# Patient Record
Sex: MALE | Race: WHITE | NOT HISPANIC OR LATINO
[De-identification: names, ages, dates, MRNs, and addresses within clinical notes are randomized per-mention and may not be internally consistent; named-entity substitution may affect disease eponyms.]

---

## 2020-04-01 ENCOUNTER — APPOINTMENT (OUTPATIENT)
Dept: GASTROENTEROLOGY | Facility: CLINIC | Age: 55
End: 2020-04-01

## 2023-05-23 ENCOUNTER — APPOINTMENT (OUTPATIENT)
Dept: OPHTHALMOLOGY | Facility: CLINIC | Age: 58
End: 2023-05-23
Payer: COMMERCIAL

## 2023-05-23 ENCOUNTER — NON-APPOINTMENT (OUTPATIENT)
Age: 58
End: 2023-05-23

## 2023-05-23 PROCEDURE — 92136 OPHTHALMIC BIOMETRY: CPT

## 2023-05-23 PROCEDURE — 92004 COMPRE OPH EXAM NEW PT 1/>: CPT

## 2023-09-11 ENCOUNTER — APPOINTMENT (OUTPATIENT)
Dept: SURGERY | Facility: CLINIC | Age: 58
End: 2023-09-11
Payer: COMMERCIAL

## 2023-09-11 VITALS
TEMPERATURE: 97.9 F | HEART RATE: 106 BPM | WEIGHT: 160 LBS | HEIGHT: 72 IN | BODY MASS INDEX: 21.67 KG/M2 | DIASTOLIC BLOOD PRESSURE: 90 MMHG | SYSTOLIC BLOOD PRESSURE: 158 MMHG

## 2023-09-11 DIAGNOSIS — Z98.890 OTHER SPECIFIED POSTPROCEDURAL STATES: ICD-10-CM

## 2023-09-11 DIAGNOSIS — Z83.3 FAMILY HISTORY OF DIABETES MELLITUS: ICD-10-CM

## 2023-09-11 DIAGNOSIS — K46.9 UNSPECIFIED ABDOMINAL HERNIA W/OUT OBSTRUCTION OR GANGRENE: ICD-10-CM

## 2023-09-11 DIAGNOSIS — Z87.19 OTHER SPECIFIED POSTPROCEDURAL STATES: ICD-10-CM

## 2023-09-11 DIAGNOSIS — E78.9 DISORDER OF LIPOPROTEIN METABOLISM, UNSPECIFIED: ICD-10-CM

## 2023-09-11 DIAGNOSIS — Z86.39 PERSONAL HISTORY OF OTHER ENDOCRINE, NUTRITIONAL AND METABOLIC DISEASE: ICD-10-CM

## 2023-09-11 DIAGNOSIS — Z78.9 OTHER SPECIFIED HEALTH STATUS: ICD-10-CM

## 2023-09-11 PROCEDURE — 99203 OFFICE O/P NEW LOW 30 MIN: CPT

## 2023-09-11 RX ORDER — DAPAGLIFLOZIN 10 MG/1
TABLET, FILM COATED ORAL
Refills: 0 | Status: ACTIVE | COMMUNITY

## 2023-09-11 RX ORDER — SITAGLIPTIN AND METFORMIN HYDROCHLORIDE 50; 1000 MG/1; MG/1
TABLET, FILM COATED ORAL
Refills: 0 | Status: ACTIVE | COMMUNITY

## 2023-10-19 ENCOUNTER — OUTPATIENT (OUTPATIENT)
Dept: OUTPATIENT SERVICES | Facility: HOSPITAL | Age: 58
LOS: 1 days | Discharge: HOME | End: 2023-10-19
Payer: COMMERCIAL

## 2023-10-19 VITALS
RESPIRATION RATE: 18 BRPM | HEIGHT: 71 IN | TEMPERATURE: 98 F | DIASTOLIC BLOOD PRESSURE: 86 MMHG | SYSTOLIC BLOOD PRESSURE: 161 MMHG | WEIGHT: 163.8 LBS | HEART RATE: 88 BPM | OXYGEN SATURATION: 100 %

## 2023-10-19 DIAGNOSIS — K40.91 UNILATERAL INGUINAL HERNIA, WITHOUT OBSTRUCTION OR GANGRENE, RECURRENT: ICD-10-CM

## 2023-10-19 DIAGNOSIS — Z01.818 ENCOUNTER FOR OTHER PREPROCEDURAL EXAMINATION: ICD-10-CM

## 2023-10-19 DIAGNOSIS — E11.9 TYPE 2 DIABETES MELLITUS WITHOUT COMPLICATIONS: ICD-10-CM

## 2023-10-19 DIAGNOSIS — Z01.812 ENCOUNTER FOR PREPROCEDURAL LABORATORY EXAMINATION: ICD-10-CM

## 2023-10-19 DIAGNOSIS — K40.90 UNILATERAL INGUINAL HERNIA, WITHOUT OBSTRUCTION OR GANGRENE, NOT SPECIFIED AS RECURRENT: ICD-10-CM

## 2023-10-19 LAB
A1C WITH ESTIMATED AVERAGE GLUCOSE RESULT: 7.8 % — HIGH (ref 4–5.6)
A1C WITH ESTIMATED AVERAGE GLUCOSE RESULT: 7.8 % — HIGH (ref 4–5.6)
ALBUMIN SERPL ELPH-MCNC: 4 G/DL — SIGNIFICANT CHANGE UP (ref 3.3–5)
ALBUMIN SERPL ELPH-MCNC: 4 G/DL — SIGNIFICANT CHANGE UP (ref 3.3–5)
ALP SERPL-CCNC: 55 U/L — SIGNIFICANT CHANGE UP (ref 40–120)
ALP SERPL-CCNC: 55 U/L — SIGNIFICANT CHANGE UP (ref 40–120)
ALT FLD-CCNC: 21 U/L — SIGNIFICANT CHANGE UP (ref 12–78)
ALT FLD-CCNC: 21 U/L — SIGNIFICANT CHANGE UP (ref 12–78)
ANION GAP SERPL CALC-SCNC: 4 MMOL/L — LOW (ref 5–17)
ANION GAP SERPL CALC-SCNC: 4 MMOL/L — LOW (ref 5–17)
AST SERPL-CCNC: 12 U/L — LOW (ref 15–37)
AST SERPL-CCNC: 12 U/L — LOW (ref 15–37)
BASOPHILS # BLD AUTO: 0.06 K/UL — SIGNIFICANT CHANGE UP (ref 0–0.2)
BASOPHILS # BLD AUTO: 0.06 K/UL — SIGNIFICANT CHANGE UP (ref 0–0.2)
BASOPHILS NFR BLD AUTO: 1 % — SIGNIFICANT CHANGE UP (ref 0–2)
BASOPHILS NFR BLD AUTO: 1 % — SIGNIFICANT CHANGE UP (ref 0–2)
BILIRUB SERPL-MCNC: 0.5 MG/DL — SIGNIFICANT CHANGE UP (ref 0.2–1.2)
BILIRUB SERPL-MCNC: 0.5 MG/DL — SIGNIFICANT CHANGE UP (ref 0.2–1.2)
BUN SERPL-MCNC: 16 MG/DL — SIGNIFICANT CHANGE UP (ref 7–23)
BUN SERPL-MCNC: 16 MG/DL — SIGNIFICANT CHANGE UP (ref 7–23)
CALCIUM SERPL-MCNC: 9.3 MG/DL — SIGNIFICANT CHANGE UP (ref 8.5–10.1)
CALCIUM SERPL-MCNC: 9.3 MG/DL — SIGNIFICANT CHANGE UP (ref 8.5–10.1)
CHLORIDE SERPL-SCNC: 104 MMOL/L — SIGNIFICANT CHANGE UP (ref 96–108)
CHLORIDE SERPL-SCNC: 104 MMOL/L — SIGNIFICANT CHANGE UP (ref 96–108)
CO2 SERPL-SCNC: 29 MMOL/L — SIGNIFICANT CHANGE UP (ref 22–31)
CO2 SERPL-SCNC: 29 MMOL/L — SIGNIFICANT CHANGE UP (ref 22–31)
CREAT SERPL-MCNC: 0.66 MG/DL — SIGNIFICANT CHANGE UP (ref 0.5–1.3)
CREAT SERPL-MCNC: 0.66 MG/DL — SIGNIFICANT CHANGE UP (ref 0.5–1.3)
EGFR: 111 ML/MIN/1.73M2 — SIGNIFICANT CHANGE UP
EGFR: 111 ML/MIN/1.73M2 — SIGNIFICANT CHANGE UP
EOSINOPHIL # BLD AUTO: 0.06 K/UL — SIGNIFICANT CHANGE UP (ref 0–0.5)
EOSINOPHIL # BLD AUTO: 0.06 K/UL — SIGNIFICANT CHANGE UP (ref 0–0.5)
EOSINOPHIL NFR BLD AUTO: 1 % — SIGNIFICANT CHANGE UP (ref 0–6)
EOSINOPHIL NFR BLD AUTO: 1 % — SIGNIFICANT CHANGE UP (ref 0–6)
ESTIMATED AVERAGE GLUCOSE: 177 MG/DL — HIGH (ref 68–114)
ESTIMATED AVERAGE GLUCOSE: 177 MG/DL — HIGH (ref 68–114)
GLUCOSE SERPL-MCNC: 206 MG/DL — HIGH (ref 70–99)
GLUCOSE SERPL-MCNC: 206 MG/DL — HIGH (ref 70–99)
HCT VFR BLD CALC: 45.2 % — SIGNIFICANT CHANGE UP (ref 39–50)
HCT VFR BLD CALC: 45.2 % — SIGNIFICANT CHANGE UP (ref 39–50)
HGB BLD-MCNC: 14.7 G/DL — SIGNIFICANT CHANGE UP (ref 13–17)
HGB BLD-MCNC: 14.7 G/DL — SIGNIFICANT CHANGE UP (ref 13–17)
IMM GRANULOCYTES NFR BLD AUTO: 0.3 % — SIGNIFICANT CHANGE UP (ref 0–0.9)
IMM GRANULOCYTES NFR BLD AUTO: 0.3 % — SIGNIFICANT CHANGE UP (ref 0–0.9)
LYMPHOCYTES # BLD AUTO: 1.55 K/UL — SIGNIFICANT CHANGE UP (ref 1–3.3)
LYMPHOCYTES # BLD AUTO: 1.55 K/UL — SIGNIFICANT CHANGE UP (ref 1–3.3)
LYMPHOCYTES # BLD AUTO: 26.1 % — SIGNIFICANT CHANGE UP (ref 13–44)
LYMPHOCYTES # BLD AUTO: 26.1 % — SIGNIFICANT CHANGE UP (ref 13–44)
MCHC RBC-ENTMCNC: 31.7 PG — SIGNIFICANT CHANGE UP (ref 27–34)
MCHC RBC-ENTMCNC: 31.7 PG — SIGNIFICANT CHANGE UP (ref 27–34)
MCHC RBC-ENTMCNC: 32.5 G/DL — SIGNIFICANT CHANGE UP (ref 32–36)
MCHC RBC-ENTMCNC: 32.5 G/DL — SIGNIFICANT CHANGE UP (ref 32–36)
MCV RBC AUTO: 97.4 FL — SIGNIFICANT CHANGE UP (ref 80–100)
MCV RBC AUTO: 97.4 FL — SIGNIFICANT CHANGE UP (ref 80–100)
MONOCYTES # BLD AUTO: 0.56 K/UL — SIGNIFICANT CHANGE UP (ref 0–0.9)
MONOCYTES # BLD AUTO: 0.56 K/UL — SIGNIFICANT CHANGE UP (ref 0–0.9)
MONOCYTES NFR BLD AUTO: 9.4 % — SIGNIFICANT CHANGE UP (ref 2–14)
MONOCYTES NFR BLD AUTO: 9.4 % — SIGNIFICANT CHANGE UP (ref 2–14)
NEUTROPHILS # BLD AUTO: 3.68 K/UL — SIGNIFICANT CHANGE UP (ref 1.8–7.4)
NEUTROPHILS # BLD AUTO: 3.68 K/UL — SIGNIFICANT CHANGE UP (ref 1.8–7.4)
NEUTROPHILS NFR BLD AUTO: 62.2 % — SIGNIFICANT CHANGE UP (ref 43–77)
NEUTROPHILS NFR BLD AUTO: 62.2 % — SIGNIFICANT CHANGE UP (ref 43–77)
NRBC # BLD: 0 /100 WBCS — SIGNIFICANT CHANGE UP (ref 0–0)
NRBC # BLD: 0 /100 WBCS — SIGNIFICANT CHANGE UP (ref 0–0)
PLATELET # BLD AUTO: 184 K/UL — SIGNIFICANT CHANGE UP (ref 150–400)
PLATELET # BLD AUTO: 184 K/UL — SIGNIFICANT CHANGE UP (ref 150–400)
POTASSIUM SERPL-MCNC: 3.7 MMOL/L — SIGNIFICANT CHANGE UP (ref 3.5–5.3)
POTASSIUM SERPL-MCNC: 3.7 MMOL/L — SIGNIFICANT CHANGE UP (ref 3.5–5.3)
POTASSIUM SERPL-SCNC: 3.7 MMOL/L — SIGNIFICANT CHANGE UP (ref 3.5–5.3)
POTASSIUM SERPL-SCNC: 3.7 MMOL/L — SIGNIFICANT CHANGE UP (ref 3.5–5.3)
PROT SERPL-MCNC: 7.4 GM/DL — SIGNIFICANT CHANGE UP (ref 6–8.3)
PROT SERPL-MCNC: 7.4 GM/DL — SIGNIFICANT CHANGE UP (ref 6–8.3)
RBC # BLD: 4.64 M/UL — SIGNIFICANT CHANGE UP (ref 4.2–5.8)
RBC # BLD: 4.64 M/UL — SIGNIFICANT CHANGE UP (ref 4.2–5.8)
RBC # FLD: 13 % — SIGNIFICANT CHANGE UP (ref 10.3–14.5)
RBC # FLD: 13 % — SIGNIFICANT CHANGE UP (ref 10.3–14.5)
SODIUM SERPL-SCNC: 137 MMOL/L — SIGNIFICANT CHANGE UP (ref 135–145)
SODIUM SERPL-SCNC: 137 MMOL/L — SIGNIFICANT CHANGE UP (ref 135–145)
WBC # BLD: 5.93 K/UL — SIGNIFICANT CHANGE UP (ref 3.8–10.5)
WBC # BLD: 5.93 K/UL — SIGNIFICANT CHANGE UP (ref 3.8–10.5)
WBC # FLD AUTO: 5.93 K/UL — SIGNIFICANT CHANGE UP (ref 3.8–10.5)
WBC # FLD AUTO: 5.93 K/UL — SIGNIFICANT CHANGE UP (ref 3.8–10.5)

## 2023-10-19 PROCEDURE — 93010 ELECTROCARDIOGRAM REPORT: CPT

## 2023-10-19 NOTE — H&P PST ADULT - PROBLEM SELECTOR PLAN 3
Preop instructions provided including NPO status. Hibiclens wash for infection control. Patient aware to stop NSAID, OTC herbals  for 7-10 days, needs to be accompanied  by adult upon discharge.  Patient verbalized understanding  Medical clearance required  anesthesiologist to review pst labs, ekg, medical clearances and optimization for surgery

## 2023-10-19 NOTE — H&P PST ADULT - ASSESSMENT
56 y/o male with hx of DM, with hx of left hernia surgery , h/o left elbow surgery (), h/o right knee surgery () presents with intermittent left groin discomfort and bulge here for presurgical examination for planned Open Left inguinal hernia repair with Dr. Joseph on 2023. Denies history of ischemic heart disease, CHF, CVA, TIA, or Kidney Disease. Denies resting or exertional chest pain, palpitations, headache, N/V, SOB, syncope or blurry vision. Denies personal or family hx of bleeding disorder or adverse reaction with anesthesia. Denies hx of DVT or PE. Denies recent travels in the past 30 days. No fever, SOB, cough, flu-like symptoms or body rash covid screen.     CAPRINI SCORE [CLOT]    AGE RELATED RISK FACTORS                                                       MOBILITY RELATED FACTORS  [x Age 41-60 years                                            (1 Point)                  [ ] Bed rest                                                        (1 Point)  [ ] Age: 61-74 years                                           (2 Points)                 [ ] Plaster cast                                                   (2 Points)  [ ] Age= 75 years                                              (3 Points)                 [ ] Bed bound for more than 72 hours                 (2 Points)    DISEASE RELATED RISK FACTORS                                               GENDER SPECIFIC FACTORS  [ ] Edema in the lower extremities                       (1 Point)                  [ ] Pregnancy                                                     (1 Point)  [ ] Varicose veins                                               (1 Point)                  [ ] Post-partum < 6 weeks                                   (1 Point)             [ ] BMI > 25 Kg/m2                                            (1 Point)                  [ ] Hormonal therapy  or oral contraception          (1 Point)                 [ ] Sepsis (in the previous month)                        (1 Point)                  [ ] History of pregnancy complications                 (1 point)  [ ] Pneumonia or serious lung disease                                               [ ] Unexplained or recurrent                     (1 Point)           (in the previous month)                               (1 Point)  [ ] Abnormal pulmonary function test                     (1 Point)                 SURGERY RELATED RISK FACTORS  [ ] Acute myocardial infarction                              (1 Point)                 [ ]  Section                                             (1 Point)  [ ] Congestive heart failure (in the previous month)  (1 Point)               [ ] Minor surgery                                                  (1 Point)   [ ] Inflammatory bowel disease                             (1 Point)                 [ ] Arthroscopic surgery                                        (2 Points)  [ ] Central venous access                                      (2 Points)                [x ] General surgery lasting more than 45 minutes   (2 Points)       [ ] Stroke (in the previous month)                          (5 Points)               [ ] Elective arthroplasty                                         (5 Points)                                                                                                                                               HEMATOLOGY RELATED FACTORS                                                 TRAUMA RELATED RISK FACTORS  [ ] Prior episodes of VTE                                     (3 Points)                [ ] Fracture of the hip, pelvis, or leg                       (5 Points)  [ ] Positive family history for VTE                         (3 Points)                 [ ] Acute spinal cord injury (in the previous month)  (5 Points)  [ ] Prothrombin 83995 A                                     (3 Points)                 [ ] Paralysis  (less than 1 month)                             (5 Points)  [ ] Factor V Leiden                                             (3 Points)                  [ ] Multiple Trauma within 1 month                        (5 Points)  [ ] Lupus anticoagulants                                     (3 Points)                                                           [ ] Anticardiolipin antibodies                               (3 Points)                                                       [ ] High homocysteine in the blood                      (3 Points)                                             [ ] Other congenital or acquired thrombophilia      (3 Points)                                                [ ] Heparin induced thrombocytopenia                  (3 Points)                                          Total Score [    3   ]    Caprini Score 0 - 2:  Low Risk, No VTE Prophylaxis required for most patients, encourage ambulation  Caprini Score 3 - 6:  At Risk, pharmacologic VTE prophylaxis is indicated for most patients (in the absence of a contraindication)  Caprini Score Greater than or = 7:  High Risk, pharmacologic VTE prophylaxis is indicated for most patients (in the absence of a contraindication)  Caprini Score 0-2: Low risk, No VTE Prophylaxis required for most patient's, encourage ambulation  Caprini Score 3-6: At Risk, Pharmacologic VTE prophylaxis is indicated for most patients ( in the absence of a contraindication)  Caprini Score Greater than or = 7: High Risk , pharmacologic VTE is indicated for most patients ( in the absence of a contraindication)    Caprini score indicates that the patient is high risk for VTE event ( score 6 or greater). Surgical patient's in this group will benefit from both pharmacologic prophylaxis and intermittent compression devices . Surgical team will determine the balance between VTE  risk and bleeding risk and other clinical considerations

## 2023-10-19 NOTE — H&P PST ADULT - NSICDXFAMILYHX_GEN_ALL_CORE_FT
FAMILY HISTORY:  Father  Still living? Unknown  FH: atrial fibrillation, Age at diagnosis: Age Unknown    Mother  Still living? Unknown  Family history of diabetes mellitus (DM), Age at diagnosis: Age Unknown

## 2023-10-19 NOTE — H&P PST ADULT - NSANTHOSAYNRD_GEN_A_CORE
No. CHAZ screening performed.  STOP BANG Legend: 0-2 = LOW Risk; 3-4 = INTERMEDIATE Risk; 5-8 = HIGH Risk

## 2023-10-22 RX ORDER — SODIUM CHLORIDE 9 MG/ML
3 INJECTION INTRAMUSCULAR; INTRAVENOUS; SUBCUTANEOUS EVERY 8 HOURS
Refills: 0 | Status: DISCONTINUED | OUTPATIENT
Start: 2023-11-02 | End: 2023-11-16

## 2023-11-01 ENCOUNTER — TRANSCRIPTION ENCOUNTER (OUTPATIENT)
Age: 58
End: 2023-11-01

## 2023-11-02 ENCOUNTER — TRANSCRIPTION ENCOUNTER (OUTPATIENT)
Age: 58
End: 2023-11-02

## 2023-11-02 ENCOUNTER — OUTPATIENT (OUTPATIENT)
Dept: OUTPATIENT SERVICES | Facility: HOSPITAL | Age: 58
LOS: 1 days | Discharge: HOME | End: 2023-11-02
Payer: COMMERCIAL

## 2023-11-02 ENCOUNTER — APPOINTMENT (OUTPATIENT)
Dept: SURGERY | Facility: HOSPITAL | Age: 58
End: 2023-11-02

## 2023-11-02 VITALS
WEIGHT: 163.8 LBS | OXYGEN SATURATION: 100 % | DIASTOLIC BLOOD PRESSURE: 89 MMHG | HEIGHT: 71 IN | HEART RATE: 84 BPM | RESPIRATION RATE: 16 BRPM | SYSTOLIC BLOOD PRESSURE: 158 MMHG | TEMPERATURE: 98 F

## 2023-11-02 VITALS
OXYGEN SATURATION: 100 % | DIASTOLIC BLOOD PRESSURE: 82 MMHG | SYSTOLIC BLOOD PRESSURE: 143 MMHG | HEART RATE: 76 BPM | RESPIRATION RATE: 16 BRPM

## 2023-11-02 LAB
GLUCOSE BLDC GLUCOMTR-MCNC: 171 MG/DL — HIGH (ref 70–99)
GLUCOSE BLDC GLUCOMTR-MCNC: 171 MG/DL — HIGH (ref 70–99)

## 2023-11-02 PROCEDURE — 49520 REREPAIR ING HERNIA REDUCE: CPT | Mod: GC

## 2023-11-02 DEVICE — MESH HERNIA PARIETEX PROGRIP 12 X 8CM LEFT: Type: IMPLANTABLE DEVICE | Site: LEFT | Status: FUNCTIONAL

## 2023-11-02 RX ORDER — HYDROMORPHONE HYDROCHLORIDE 2 MG/ML
0.5 INJECTION INTRAMUSCULAR; INTRAVENOUS; SUBCUTANEOUS
Refills: 0 | Status: DISCONTINUED | OUTPATIENT
Start: 2023-11-02 | End: 2023-11-04

## 2023-11-02 RX ORDER — SODIUM CHLORIDE 9 MG/ML
1000 INJECTION, SOLUTION INTRAVENOUS
Refills: 0 | Status: DISCONTINUED | OUTPATIENT
Start: 2023-11-02 | End: 2023-11-04

## 2023-11-02 RX ORDER — SITAGLIPTIN AND METFORMIN HYDROCHLORIDE 500; 50 MG/1; MG/1
1 TABLET, FILM COATED ORAL
Refills: 0 | DISCHARGE

## 2023-11-02 RX ORDER — OXYCODONE HYDROCHLORIDE 5 MG/1
1 TABLET ORAL
Qty: 12 | Refills: 0
Start: 2023-11-02 | End: 2023-11-04

## 2023-11-02 RX ADMIN — SODIUM CHLORIDE 75 MILLILITER(S): 9 INJECTION, SOLUTION INTRAVENOUS at 15:15

## 2023-11-02 NOTE — BRIEF OPERATIVE NOTE - NSICDXBRIEFPROCEDURE_GEN_ALL_CORE_FT
PROCEDURES:  Repair, hernia, inguinal, reducible, age 5 years or older 02-Nov-2023 14:20:49  Nicole Johnson

## 2023-11-02 NOTE — ASU PATIENT PROFILE, ADULT - FALL HARM RISK - HARM RISK INTERVENTIONS

## 2023-11-02 NOTE — ASU DISCHARGE PLAN (ADULT/PEDIATRIC) - CARE PROVIDER_API CALL
Huan Joseph  Surgery  [address] [place] [address]  Phone: [phone]  Fax: [phone]  Follow Up Time:

## 2023-11-02 NOTE — ASU DISCHARGE PLAN (ADULT/PEDIATRIC) - ASU DC SPECIAL INSTRUCTIONSFT
Pain management and post-op instructions about your surgery:    Extra strength Tylenol 500mg 2 tabs and Ibuprofen 600mg every 6 hours together  Oxycodone 5mg every 6 hours as needed for breakthrough pain  Ice pack  Walk as much as tolerated  You may shower. Do not scrub incision. Pat Dry abdomen. Leave the white steri strips in place, they will fall off on their own in approximately 5-7 days.     Follow up with Dr. Joseph in 2 weeks.  You may call the office to schedule an appointment.

## 2023-11-02 NOTE — ASU DISCHARGE PLAN (ADULT/PEDIATRIC) - NS MD DC FALL RISK RISK
For information on Fall & Injury Prevention, visit: [email or web address] OR  [email or web address] OR  https://www.cdc.gov/steadi/patient.html

## 2023-11-09 DIAGNOSIS — E11.9 TYPE 2 DIABETES MELLITUS WITHOUT COMPLICATIONS: ICD-10-CM

## 2023-11-09 DIAGNOSIS — K40.31 UNILATERAL INGUINAL HERNIA, WITH OBSTRUCTION, WITHOUT GANGRENE, RECURRENT: ICD-10-CM

## 2023-11-09 DIAGNOSIS — Z79.84 LONG TERM (CURRENT) USE OF ORAL HYPOGLYCEMIC DRUGS: ICD-10-CM

## 2023-11-09 DIAGNOSIS — K40.91 UNILATERAL INGUINAL HERNIA, WITHOUT OBSTRUCTION OR GANGRENE, RECURRENT: ICD-10-CM

## 2023-11-13 ENCOUNTER — APPOINTMENT (OUTPATIENT)
Dept: SURGERY | Facility: CLINIC | Age: 58
End: 2023-11-13
Payer: COMMERCIAL

## 2023-11-13 VITALS
DIASTOLIC BLOOD PRESSURE: 82 MMHG | HEIGHT: 72 IN | HEART RATE: 80 BPM | WEIGHT: 160 LBS | BODY MASS INDEX: 21.67 KG/M2 | SYSTOLIC BLOOD PRESSURE: 134 MMHG | TEMPERATURE: 97.9 F

## 2023-11-13 DIAGNOSIS — Z83.3 FAMILY HISTORY OF DIABETES MELLITUS: ICD-10-CM

## 2023-11-13 DIAGNOSIS — Z78.9 OTHER SPECIFIED HEALTH STATUS: ICD-10-CM

## 2023-11-13 PROCEDURE — 99024 POSTOP FOLLOW-UP VISIT: CPT

## 2023-12-09 ENCOUNTER — NON-APPOINTMENT (OUTPATIENT)
Age: 58
End: 2023-12-09

## 2023-12-12 ENCOUNTER — INPATIENT (INPATIENT)
Facility: HOSPITAL | Age: 58
LOS: 1 days | Discharge: HOME | DRG: 639 | End: 2023-12-14
Attending: INTERNAL MEDICINE | Admitting: INTERNAL MEDICINE
Payer: COMMERCIAL

## 2023-12-12 VITALS
TEMPERATURE: 97 F | WEIGHT: 160.06 LBS | RESPIRATION RATE: 16 BRPM | SYSTOLIC BLOOD PRESSURE: 139 MMHG | HEART RATE: 112 BPM | OXYGEN SATURATION: 100 % | HEIGHT: 71 IN | DIASTOLIC BLOOD PRESSURE: 89 MMHG

## 2023-12-12 DIAGNOSIS — E11.10 TYPE 2 DIABETES MELLITUS WITH KETOACIDOSIS WITHOUT COMA: ICD-10-CM

## 2023-12-12 LAB
ACETONE SERPL-MCNC: ABNORMAL
ACETONE SERPL-MCNC: ABNORMAL
ALBUMIN SERPL ELPH-MCNC: 3.3 G/DL — SIGNIFICANT CHANGE UP (ref 3.3–5)
ALBUMIN SERPL ELPH-MCNC: 3.3 G/DL — SIGNIFICANT CHANGE UP (ref 3.3–5)
ALBUMIN SERPL ELPH-MCNC: 4.1 G/DL — SIGNIFICANT CHANGE UP (ref 3.3–5)
ALBUMIN SERPL ELPH-MCNC: 4.1 G/DL — SIGNIFICANT CHANGE UP (ref 3.3–5)
ALP SERPL-CCNC: 44 U/L — SIGNIFICANT CHANGE UP (ref 40–120)
ALP SERPL-CCNC: 44 U/L — SIGNIFICANT CHANGE UP (ref 40–120)
ALP SERPL-CCNC: 57 U/L — SIGNIFICANT CHANGE UP (ref 40–120)
ALP SERPL-CCNC: 57 U/L — SIGNIFICANT CHANGE UP (ref 40–120)
ALT FLD-CCNC: 18 U/L — SIGNIFICANT CHANGE UP (ref 10–45)
ALT FLD-CCNC: 18 U/L — SIGNIFICANT CHANGE UP (ref 10–45)
ALT FLD-CCNC: 19 U/L — SIGNIFICANT CHANGE UP (ref 10–45)
ALT FLD-CCNC: 19 U/L — SIGNIFICANT CHANGE UP (ref 10–45)
AMPHET UR-MCNC: NEGATIVE — SIGNIFICANT CHANGE UP
AMPHET UR-MCNC: NEGATIVE — SIGNIFICANT CHANGE UP
ANION GAP SERPL CALC-SCNC: 18 MMOL/L — HIGH (ref 5–17)
ANION GAP SERPL CALC-SCNC: 18 MMOL/L — HIGH (ref 5–17)
ANION GAP SERPL CALC-SCNC: 20 MMOL/L — HIGH (ref 5–17)
ANION GAP SERPL CALC-SCNC: 20 MMOL/L — HIGH (ref 5–17)
ANION GAP SERPL CALC-SCNC: 21 MMOL/L — HIGH (ref 5–17)
ANION GAP SERPL CALC-SCNC: 21 MMOL/L — HIGH (ref 5–17)
ANION GAP SERPL CALC-SCNC: 28 MMOL/L — HIGH (ref 5–17)
ANION GAP SERPL CALC-SCNC: 28 MMOL/L — HIGH (ref 5–17)
AST SERPL-CCNC: 12 U/L — SIGNIFICANT CHANGE UP (ref 10–40)
AST SERPL-CCNC: 12 U/L — SIGNIFICANT CHANGE UP (ref 10–40)
AST SERPL-CCNC: 17 U/L — SIGNIFICANT CHANGE UP (ref 10–40)
AST SERPL-CCNC: 17 U/L — SIGNIFICANT CHANGE UP (ref 10–40)
BARBITURATES UR SCN-MCNC: NEGATIVE — SIGNIFICANT CHANGE UP
BARBITURATES UR SCN-MCNC: NEGATIVE — SIGNIFICANT CHANGE UP
BASE EXCESS BLDA CALC-SCNC: -17.9 MMOL/L — LOW (ref -2–3)
BASE EXCESS BLDA CALC-SCNC: -17.9 MMOL/L — LOW (ref -2–3)
BASE EXCESS BLDV CALC-SCNC: -18.1 MMOL/L — LOW (ref -2–3)
BASE EXCESS BLDV CALC-SCNC: -18.1 MMOL/L — LOW (ref -2–3)
BASOPHILS # BLD AUTO: 0.01 K/UL — SIGNIFICANT CHANGE UP (ref 0–0.2)
BASOPHILS # BLD AUTO: 0.01 K/UL — SIGNIFICANT CHANGE UP (ref 0–0.2)
BASOPHILS NFR BLD AUTO: 0.1 % — SIGNIFICANT CHANGE UP (ref 0–2)
BASOPHILS NFR BLD AUTO: 0.1 % — SIGNIFICANT CHANGE UP (ref 0–2)
BENZODIAZ UR-MCNC: NEGATIVE — SIGNIFICANT CHANGE UP
BENZODIAZ UR-MCNC: NEGATIVE — SIGNIFICANT CHANGE UP
BILIRUB SERPL-MCNC: 0.3 MG/DL — SIGNIFICANT CHANGE UP (ref 0.2–1.2)
BILIRUB SERPL-MCNC: 0.3 MG/DL — SIGNIFICANT CHANGE UP (ref 0.2–1.2)
BILIRUB SERPL-MCNC: 0.4 MG/DL — SIGNIFICANT CHANGE UP (ref 0.2–1.2)
BILIRUB SERPL-MCNC: 0.4 MG/DL — SIGNIFICANT CHANGE UP (ref 0.2–1.2)
BLOOD GAS COMMENTS ARTERIAL: SIGNIFICANT CHANGE UP
BLOOD GAS COMMENTS ARTERIAL: SIGNIFICANT CHANGE UP
BUN SERPL-MCNC: 19 MG/DL — SIGNIFICANT CHANGE UP (ref 7–23)
BUN SERPL-MCNC: 19 MG/DL — SIGNIFICANT CHANGE UP (ref 7–23)
BUN SERPL-MCNC: 20 MG/DL — SIGNIFICANT CHANGE UP (ref 7–23)
BUN SERPL-MCNC: 20 MG/DL — SIGNIFICANT CHANGE UP (ref 7–23)
BUN SERPL-MCNC: 21 MG/DL — SIGNIFICANT CHANGE UP (ref 7–23)
BUN SERPL-MCNC: 21 MG/DL — SIGNIFICANT CHANGE UP (ref 7–23)
BUN SERPL-MCNC: 26 MG/DL — HIGH (ref 7–23)
BUN SERPL-MCNC: 26 MG/DL — HIGH (ref 7–23)
CALCIUM SERPL-MCNC: 8.4 MG/DL — SIGNIFICANT CHANGE UP (ref 8.4–10.5)
CALCIUM SERPL-MCNC: 8.4 MG/DL — SIGNIFICANT CHANGE UP (ref 8.4–10.5)
CALCIUM SERPL-MCNC: 8.6 MG/DL — SIGNIFICANT CHANGE UP (ref 8.4–10.5)
CALCIUM SERPL-MCNC: 8.6 MG/DL — SIGNIFICANT CHANGE UP (ref 8.4–10.5)
CALCIUM SERPL-MCNC: 8.7 MG/DL — SIGNIFICANT CHANGE UP (ref 8.4–10.5)
CALCIUM SERPL-MCNC: 8.7 MG/DL — SIGNIFICANT CHANGE UP (ref 8.4–10.5)
CALCIUM SERPL-MCNC: 9.8 MG/DL — SIGNIFICANT CHANGE UP (ref 8.4–10.5)
CALCIUM SERPL-MCNC: 9.8 MG/DL — SIGNIFICANT CHANGE UP (ref 8.4–10.5)
CHLORIDE SERPL-SCNC: 104 MMOL/L — SIGNIFICANT CHANGE UP (ref 96–108)
CHLORIDE SERPL-SCNC: 104 MMOL/L — SIGNIFICANT CHANGE UP (ref 96–108)
CHLORIDE SERPL-SCNC: 105 MMOL/L — SIGNIFICANT CHANGE UP (ref 96–108)
CHLORIDE SERPL-SCNC: 105 MMOL/L — SIGNIFICANT CHANGE UP (ref 96–108)
CHLORIDE SERPL-SCNC: 106 MMOL/L — SIGNIFICANT CHANGE UP (ref 96–108)
CHLORIDE SERPL-SCNC: 106 MMOL/L — SIGNIFICANT CHANGE UP (ref 96–108)
CHLORIDE SERPL-SCNC: 98 MMOL/L — SIGNIFICANT CHANGE UP (ref 96–108)
CHLORIDE SERPL-SCNC: 98 MMOL/L — SIGNIFICANT CHANGE UP (ref 96–108)
CO2 BLDA-SCNC: 11 MMOL/L — LOW (ref 19–24)
CO2 BLDA-SCNC: 11 MMOL/L — LOW (ref 19–24)
CO2 BLDV-SCNC: 12 MMOL/L — LOW (ref 22–26)
CO2 BLDV-SCNC: 12 MMOL/L — LOW (ref 22–26)
CO2 SERPL-SCNC: 11 MMOL/L — LOW (ref 22–31)
CO2 SERPL-SCNC: 11 MMOL/L — LOW (ref 22–31)
CO2 SERPL-SCNC: 14 MMOL/L — LOW (ref 22–31)
CO2 SERPL-SCNC: 17 MMOL/L — LOW (ref 22–31)
CO2 SERPL-SCNC: 17 MMOL/L — LOW (ref 22–31)
COCAINE METAB.OTHER UR-MCNC: NEGATIVE — SIGNIFICANT CHANGE UP
COCAINE METAB.OTHER UR-MCNC: NEGATIVE — SIGNIFICANT CHANGE UP
CREAT SERPL-MCNC: 0.94 MG/DL — SIGNIFICANT CHANGE UP (ref 0.5–1.3)
CREAT SERPL-MCNC: 0.94 MG/DL — SIGNIFICANT CHANGE UP (ref 0.5–1.3)
CREAT SERPL-MCNC: 1.02 MG/DL — SIGNIFICANT CHANGE UP (ref 0.5–1.3)
CREAT SERPL-MCNC: 1.02 MG/DL — SIGNIFICANT CHANGE UP (ref 0.5–1.3)
CREAT SERPL-MCNC: 1.06 MG/DL — SIGNIFICANT CHANGE UP (ref 0.5–1.3)
CREAT SERPL-MCNC: 1.06 MG/DL — SIGNIFICANT CHANGE UP (ref 0.5–1.3)
CREAT SERPL-MCNC: 1.18 MG/DL — SIGNIFICANT CHANGE UP (ref 0.5–1.3)
CREAT SERPL-MCNC: 1.18 MG/DL — SIGNIFICANT CHANGE UP (ref 0.5–1.3)
EGFR: 73 ML/MIN/1.73M2 — SIGNIFICANT CHANGE UP
EGFR: 73 ML/MIN/1.73M2 — SIGNIFICANT CHANGE UP
EGFR: 83 ML/MIN/1.73M2 — SIGNIFICANT CHANGE UP
EGFR: 83 ML/MIN/1.73M2 — SIGNIFICANT CHANGE UP
EGFR: 87 ML/MIN/1.73M2 — SIGNIFICANT CHANGE UP
EGFR: 87 ML/MIN/1.73M2 — SIGNIFICANT CHANGE UP
EGFR: 96 ML/MIN/1.73M2 — SIGNIFICANT CHANGE UP
EGFR: 96 ML/MIN/1.73M2 — SIGNIFICANT CHANGE UP
EOSINOPHIL # BLD AUTO: 0.01 K/UL — SIGNIFICANT CHANGE UP (ref 0–0.5)
EOSINOPHIL # BLD AUTO: 0.01 K/UL — SIGNIFICANT CHANGE UP (ref 0–0.5)
EOSINOPHIL NFR BLD AUTO: 0.1 % — SIGNIFICANT CHANGE UP (ref 0–6)
EOSINOPHIL NFR BLD AUTO: 0.1 % — SIGNIFICANT CHANGE UP (ref 0–6)
FLUAV H3 RNA SPEC QL NAA+PROBE: DETECTED
FLUAV H3 RNA SPEC QL NAA+PROBE: DETECTED
GAS PNL BLDA: SIGNIFICANT CHANGE UP
GAS PNL BLDA: SIGNIFICANT CHANGE UP
GAS PNL BLDV: SIGNIFICANT CHANGE UP
GAS PNL BLDV: SIGNIFICANT CHANGE UP
GLUCOSE BLDC GLUCOMTR-MCNC: 113 MG/DL — HIGH (ref 70–99)
GLUCOSE BLDC GLUCOMTR-MCNC: 113 MG/DL — HIGH (ref 70–99)
GLUCOSE BLDC GLUCOMTR-MCNC: 120 MG/DL — HIGH (ref 70–99)
GLUCOSE BLDC GLUCOMTR-MCNC: 120 MG/DL — HIGH (ref 70–99)
GLUCOSE BLDC GLUCOMTR-MCNC: 125 MG/DL — HIGH (ref 70–99)
GLUCOSE BLDC GLUCOMTR-MCNC: 125 MG/DL — HIGH (ref 70–99)
GLUCOSE BLDC GLUCOMTR-MCNC: 128 MG/DL — HIGH (ref 70–99)
GLUCOSE BLDC GLUCOMTR-MCNC: 128 MG/DL — HIGH (ref 70–99)
GLUCOSE BLDC GLUCOMTR-MCNC: 137 MG/DL — HIGH (ref 70–99)
GLUCOSE BLDC GLUCOMTR-MCNC: 137 MG/DL — HIGH (ref 70–99)
GLUCOSE BLDC GLUCOMTR-MCNC: 154 MG/DL — HIGH (ref 70–99)
GLUCOSE BLDC GLUCOMTR-MCNC: 154 MG/DL — HIGH (ref 70–99)
GLUCOSE BLDC GLUCOMTR-MCNC: 164 MG/DL — HIGH (ref 70–99)
GLUCOSE BLDC GLUCOMTR-MCNC: 164 MG/DL — HIGH (ref 70–99)
GLUCOSE BLDC GLUCOMTR-MCNC: 202 MG/DL — HIGH (ref 70–99)
GLUCOSE BLDC GLUCOMTR-MCNC: 202 MG/DL — HIGH (ref 70–99)
GLUCOSE BLDC GLUCOMTR-MCNC: 242 MG/DL — HIGH (ref 70–99)
GLUCOSE BLDC GLUCOMTR-MCNC: 242 MG/DL — HIGH (ref 70–99)
GLUCOSE SERPL-MCNC: 143 MG/DL — HIGH (ref 70–99)
GLUCOSE SERPL-MCNC: 143 MG/DL — HIGH (ref 70–99)
GLUCOSE SERPL-MCNC: 157 MG/DL — HIGH (ref 70–99)
GLUCOSE SERPL-MCNC: 157 MG/DL — HIGH (ref 70–99)
GLUCOSE SERPL-MCNC: 231 MG/DL — HIGH (ref 70–99)
GLUCOSE SERPL-MCNC: 231 MG/DL — HIGH (ref 70–99)
GLUCOSE SERPL-MCNC: 294 MG/DL — HIGH (ref 70–99)
GLUCOSE SERPL-MCNC: 294 MG/DL — HIGH (ref 70–99)
HCO3 BLDA-SCNC: 10 MMOL/L — CRITICAL LOW (ref 21–28)
HCO3 BLDA-SCNC: 10 MMOL/L — CRITICAL LOW (ref 21–28)
HCO3 BLDV-SCNC: 11 MMOL/L — LOW (ref 22–29)
HCO3 BLDV-SCNC: 11 MMOL/L — LOW (ref 22–29)
HCT VFR BLD CALC: 50.1 % — HIGH (ref 39–50)
HCT VFR BLD CALC: 50.1 % — HIGH (ref 39–50)
HGB BLD-MCNC: 16.1 G/DL — SIGNIFICANT CHANGE UP (ref 13–17)
HGB BLD-MCNC: 16.1 G/DL — SIGNIFICANT CHANGE UP (ref 13–17)
HOROWITZ INDEX BLDA+IHG-RTO: 21 — SIGNIFICANT CHANGE UP
HOROWITZ INDEX BLDA+IHG-RTO: 21 — SIGNIFICANT CHANGE UP
IMM GRANULOCYTES NFR BLD AUTO: 0.5 % — SIGNIFICANT CHANGE UP (ref 0–0.9)
IMM GRANULOCYTES NFR BLD AUTO: 0.5 % — SIGNIFICANT CHANGE UP (ref 0–0.9)
LIDOCAIN IGE QN: 17 U/L — SIGNIFICANT CHANGE UP (ref 16–77)
LIDOCAIN IGE QN: 17 U/L — SIGNIFICANT CHANGE UP (ref 16–77)
LYMPHOCYTES # BLD AUTO: 0.48 K/UL — LOW (ref 1–3.3)
LYMPHOCYTES # BLD AUTO: 0.48 K/UL — LOW (ref 1–3.3)
LYMPHOCYTES # BLD AUTO: 6.1 % — LOW (ref 13–44)
LYMPHOCYTES # BLD AUTO: 6.1 % — LOW (ref 13–44)
MAGNESIUM SERPL-MCNC: 2 MG/DL — SIGNIFICANT CHANGE UP (ref 1.6–2.6)
MAGNESIUM SERPL-MCNC: 2 MG/DL — SIGNIFICANT CHANGE UP (ref 1.6–2.6)
MAGNESIUM SERPL-MCNC: 2.1 MG/DL — SIGNIFICANT CHANGE UP (ref 1.6–2.6)
MAGNESIUM SERPL-MCNC: 2.1 MG/DL — SIGNIFICANT CHANGE UP (ref 1.6–2.6)
MCHC RBC-ENTMCNC: 31.9 PG — SIGNIFICANT CHANGE UP (ref 27–34)
MCHC RBC-ENTMCNC: 31.9 PG — SIGNIFICANT CHANGE UP (ref 27–34)
MCHC RBC-ENTMCNC: 32.1 GM/DL — SIGNIFICANT CHANGE UP (ref 32–36)
MCHC RBC-ENTMCNC: 32.1 GM/DL — SIGNIFICANT CHANGE UP (ref 32–36)
MCV RBC AUTO: 99.4 FL — SIGNIFICANT CHANGE UP (ref 80–100)
MCV RBC AUTO: 99.4 FL — SIGNIFICANT CHANGE UP (ref 80–100)
METHADONE UR-MCNC: NEGATIVE — SIGNIFICANT CHANGE UP
METHADONE UR-MCNC: NEGATIVE — SIGNIFICANT CHANGE UP
MONOCYTES # BLD AUTO: 0.59 K/UL — SIGNIFICANT CHANGE UP (ref 0–0.9)
MONOCYTES # BLD AUTO: 0.59 K/UL — SIGNIFICANT CHANGE UP (ref 0–0.9)
MONOCYTES NFR BLD AUTO: 7.5 % — SIGNIFICANT CHANGE UP (ref 2–14)
MONOCYTES NFR BLD AUTO: 7.5 % — SIGNIFICANT CHANGE UP (ref 2–14)
NEUTROPHILS # BLD AUTO: 6.7 K/UL — SIGNIFICANT CHANGE UP (ref 1.8–7.4)
NEUTROPHILS # BLD AUTO: 6.7 K/UL — SIGNIFICANT CHANGE UP (ref 1.8–7.4)
NEUTROPHILS NFR BLD AUTO: 85.7 % — HIGH (ref 43–77)
NEUTROPHILS NFR BLD AUTO: 85.7 % — HIGH (ref 43–77)
NRBC # BLD: 0 /100 WBCS — SIGNIFICANT CHANGE UP (ref 0–0)
NRBC # BLD: 0 /100 WBCS — SIGNIFICANT CHANGE UP (ref 0–0)
OPIATES UR-MCNC: NEGATIVE — SIGNIFICANT CHANGE UP
OPIATES UR-MCNC: NEGATIVE — SIGNIFICANT CHANGE UP
PCO2 BLDA: 25 MMHG — LOW (ref 35–48)
PCO2 BLDA: 25 MMHG — LOW (ref 35–48)
PCO2 BLDV: 36 MMHG — LOW (ref 42–55)
PCO2 BLDV: 36 MMHG — LOW (ref 42–55)
PCP SPEC-MCNC: SIGNIFICANT CHANGE UP
PCP SPEC-MCNC: SIGNIFICANT CHANGE UP
PCP UR-MCNC: NEGATIVE — SIGNIFICANT CHANGE UP
PCP UR-MCNC: NEGATIVE — SIGNIFICANT CHANGE UP
PH BLDA: 7.21 — LOW (ref 7.35–7.45)
PH BLDA: 7.21 — LOW (ref 7.35–7.45)
PH BLDV: 7.11 — LOW (ref 7.32–7.43)
PH BLDV: 7.11 — LOW (ref 7.32–7.43)
PHOSPHATE SERPL-MCNC: 2 MG/DL — LOW (ref 2.5–4.5)
PHOSPHATE SERPL-MCNC: 2 MG/DL — LOW (ref 2.5–4.5)
PLATELET # BLD AUTO: 176 K/UL — SIGNIFICANT CHANGE UP (ref 150–400)
PLATELET # BLD AUTO: 176 K/UL — SIGNIFICANT CHANGE UP (ref 150–400)
PO2 BLDA: 113 MMHG — HIGH (ref 83–108)
PO2 BLDA: 113 MMHG — HIGH (ref 83–108)
PO2 BLDV: 40 MMHG — SIGNIFICANT CHANGE UP (ref 25–45)
PO2 BLDV: 40 MMHG — SIGNIFICANT CHANGE UP (ref 25–45)
POTASSIUM SERPL-MCNC: 3.9 MMOL/L — SIGNIFICANT CHANGE UP (ref 3.5–5.3)
POTASSIUM SERPL-MCNC: 3.9 MMOL/L — SIGNIFICANT CHANGE UP (ref 3.5–5.3)
POTASSIUM SERPL-MCNC: 4.1 MMOL/L — SIGNIFICANT CHANGE UP (ref 3.5–5.3)
POTASSIUM SERPL-MCNC: 4.1 MMOL/L — SIGNIFICANT CHANGE UP (ref 3.5–5.3)
POTASSIUM SERPL-MCNC: 4.5 MMOL/L — SIGNIFICANT CHANGE UP (ref 3.5–5.3)
POTASSIUM SERPL-MCNC: 4.5 MMOL/L — SIGNIFICANT CHANGE UP (ref 3.5–5.3)
POTASSIUM SERPL-MCNC: 4.6 MMOL/L — SIGNIFICANT CHANGE UP (ref 3.5–5.3)
POTASSIUM SERPL-MCNC: 4.6 MMOL/L — SIGNIFICANT CHANGE UP (ref 3.5–5.3)
POTASSIUM SERPL-SCNC: 3.9 MMOL/L — SIGNIFICANT CHANGE UP (ref 3.5–5.3)
POTASSIUM SERPL-SCNC: 3.9 MMOL/L — SIGNIFICANT CHANGE UP (ref 3.5–5.3)
POTASSIUM SERPL-SCNC: 4.1 MMOL/L — SIGNIFICANT CHANGE UP (ref 3.5–5.3)
POTASSIUM SERPL-SCNC: 4.1 MMOL/L — SIGNIFICANT CHANGE UP (ref 3.5–5.3)
POTASSIUM SERPL-SCNC: 4.5 MMOL/L — SIGNIFICANT CHANGE UP (ref 3.5–5.3)
POTASSIUM SERPL-SCNC: 4.5 MMOL/L — SIGNIFICANT CHANGE UP (ref 3.5–5.3)
POTASSIUM SERPL-SCNC: 4.6 MMOL/L — SIGNIFICANT CHANGE UP (ref 3.5–5.3)
POTASSIUM SERPL-SCNC: 4.6 MMOL/L — SIGNIFICANT CHANGE UP (ref 3.5–5.3)
PROT SERPL-MCNC: 6.9 G/DL — SIGNIFICANT CHANGE UP (ref 6–8.3)
PROT SERPL-MCNC: 6.9 G/DL — SIGNIFICANT CHANGE UP (ref 6–8.3)
PROT SERPL-MCNC: 8.6 G/DL — HIGH (ref 6–8.3)
PROT SERPL-MCNC: 8.6 G/DL — HIGH (ref 6–8.3)
RAPID RVP RESULT: DETECTED
RAPID RVP RESULT: DETECTED
RBC # BLD: 5.04 M/UL — SIGNIFICANT CHANGE UP (ref 4.2–5.8)
RBC # BLD: 5.04 M/UL — SIGNIFICANT CHANGE UP (ref 4.2–5.8)
RBC # FLD: 13.3 % — SIGNIFICANT CHANGE UP (ref 10.3–14.5)
RBC # FLD: 13.3 % — SIGNIFICANT CHANGE UP (ref 10.3–14.5)
SAO2 % BLDA: 98.7 % — HIGH (ref 94–98)
SAO2 % BLDA: 98.7 % — HIGH (ref 94–98)
SAO2 % BLDV: 69.7 % — SIGNIFICANT CHANGE UP (ref 67–88)
SAO2 % BLDV: 69.7 % — SIGNIFICANT CHANGE UP (ref 67–88)
SARS-COV-2 RNA SPEC QL NAA+PROBE: SIGNIFICANT CHANGE UP
SARS-COV-2 RNA SPEC QL NAA+PROBE: SIGNIFICANT CHANGE UP
SODIUM SERPL-SCNC: 137 MMOL/L — SIGNIFICANT CHANGE UP (ref 135–145)
SODIUM SERPL-SCNC: 137 MMOL/L — SIGNIFICANT CHANGE UP (ref 135–145)
SODIUM SERPL-SCNC: 138 MMOL/L — SIGNIFICANT CHANGE UP (ref 135–145)
SODIUM SERPL-SCNC: 138 MMOL/L — SIGNIFICANT CHANGE UP (ref 135–145)
SODIUM SERPL-SCNC: 140 MMOL/L — SIGNIFICANT CHANGE UP (ref 135–145)
SODIUM SERPL-SCNC: 140 MMOL/L — SIGNIFICANT CHANGE UP (ref 135–145)
SODIUM SERPL-SCNC: 141 MMOL/L — SIGNIFICANT CHANGE UP (ref 135–145)
SODIUM SERPL-SCNC: 141 MMOL/L — SIGNIFICANT CHANGE UP (ref 135–145)
THC UR QL: NEGATIVE — SIGNIFICANT CHANGE UP
THC UR QL: NEGATIVE — SIGNIFICANT CHANGE UP
WBC # BLD: 7.83 K/UL — SIGNIFICANT CHANGE UP (ref 3.8–10.5)
WBC # BLD: 7.83 K/UL — SIGNIFICANT CHANGE UP (ref 3.8–10.5)
WBC # FLD AUTO: 7.83 K/UL — SIGNIFICANT CHANGE UP (ref 3.8–10.5)
WBC # FLD AUTO: 7.83 K/UL — SIGNIFICANT CHANGE UP (ref 3.8–10.5)

## 2023-12-12 PROCEDURE — 99285 EMERGENCY DEPT VISIT HI MDM: CPT

## 2023-12-12 RX ORDER — INSULIN HUMAN 100 [IU]/ML
5 INJECTION, SOLUTION SUBCUTANEOUS
Qty: 100 | Refills: 0 | Status: DISCONTINUED | OUTPATIENT
Start: 2023-12-12 | End: 2023-12-12

## 2023-12-12 RX ORDER — ONDANSETRON 8 MG/1
1 TABLET, FILM COATED ORAL
Qty: 3 | Refills: 0
Start: 2023-12-12 | End: 2023-12-21

## 2023-12-12 RX ORDER — DEXTROSE 50 % IN WATER 50 %
25 SYRINGE (ML) INTRAVENOUS ONCE
Refills: 0 | Status: DISCONTINUED | OUTPATIENT
Start: 2023-12-12 | End: 2023-12-14

## 2023-12-12 RX ORDER — FAMOTIDINE 10 MG/ML
20 INJECTION INTRAVENOUS ONCE
Refills: 0 | Status: COMPLETED | OUTPATIENT
Start: 2023-12-12 | End: 2023-12-12

## 2023-12-12 RX ORDER — ACETAMINOPHEN 500 MG
650 TABLET ORAL EVERY 6 HOURS
Refills: 0 | Status: DISCONTINUED | OUTPATIENT
Start: 2023-12-12 | End: 2023-12-14

## 2023-12-12 RX ORDER — ENOXAPARIN SODIUM 100 MG/ML
40 INJECTION SUBCUTANEOUS EVERY 24 HOURS
Refills: 0 | Status: DISCONTINUED | OUTPATIENT
Start: 2023-12-12 | End: 2023-12-14

## 2023-12-12 RX ORDER — SODIUM CHLORIDE 9 MG/ML
1000 INJECTION INTRAMUSCULAR; INTRAVENOUS; SUBCUTANEOUS ONCE
Refills: 0 | Status: COMPLETED | OUTPATIENT
Start: 2023-12-12 | End: 2023-12-12

## 2023-12-12 RX ORDER — LANOLIN ALCOHOL/MO/W.PET/CERES
3 CREAM (GRAM) TOPICAL AT BEDTIME
Refills: 0 | Status: DISCONTINUED | OUTPATIENT
Start: 2023-12-12 | End: 2023-12-14

## 2023-12-12 RX ORDER — SODIUM CHLORIDE 9 MG/ML
2300 INJECTION INTRAMUSCULAR; INTRAVENOUS; SUBCUTANEOUS ONCE
Refills: 0 | Status: COMPLETED | OUTPATIENT
Start: 2023-12-12 | End: 2023-12-12

## 2023-12-12 RX ORDER — SODIUM CHLORIDE 9 MG/ML
1000 INJECTION, SOLUTION INTRAVENOUS
Refills: 0 | Status: DISCONTINUED | OUTPATIENT
Start: 2023-12-12 | End: 2023-12-13

## 2023-12-12 RX ORDER — INSULIN HUMAN 100 [IU]/ML
2.5 INJECTION, SOLUTION SUBCUTANEOUS
Qty: 100 | Refills: 0 | Status: DISCONTINUED | OUTPATIENT
Start: 2023-12-12 | End: 2023-12-13

## 2023-12-12 RX ORDER — ONDANSETRON 8 MG/1
4 TABLET, FILM COATED ORAL EVERY 6 HOURS
Refills: 0 | Status: DISCONTINUED | OUTPATIENT
Start: 2023-12-12 | End: 2023-12-14

## 2023-12-12 RX ORDER — ONDANSETRON 8 MG/1
4 TABLET, FILM COATED ORAL ONCE
Refills: 0 | Status: COMPLETED | OUTPATIENT
Start: 2023-12-12 | End: 2023-12-12

## 2023-12-12 RX ORDER — ENOXAPARIN SODIUM 100 MG/ML
30 INJECTION SUBCUTANEOUS EVERY 24 HOURS
Refills: 0 | Status: DISCONTINUED | OUTPATIENT
Start: 2023-12-12 | End: 2023-12-12

## 2023-12-12 RX ORDER — INSULIN HUMAN 100 [IU]/ML
4 INJECTION, SOLUTION SUBCUTANEOUS ONCE
Refills: 0 | Status: COMPLETED | OUTPATIENT
Start: 2023-12-12 | End: 2023-12-12

## 2023-12-12 RX ORDER — DAPAGLIFLOZIN 10 MG/1
10 TABLET, FILM COATED ORAL ONCE
Refills: 0 | Status: COMPLETED | OUTPATIENT
Start: 2023-12-12 | End: 2023-12-12

## 2023-12-12 RX ORDER — FAMOTIDINE 10 MG/ML
1 INJECTION INTRAVENOUS
Qty: 20 | Refills: 0
Start: 2023-12-12 | End: 2023-12-21

## 2023-12-12 RX ORDER — CHLORHEXIDINE GLUCONATE 213 G/1000ML
1 SOLUTION TOPICAL
Refills: 0 | Status: DISCONTINUED | OUTPATIENT
Start: 2023-12-12 | End: 2023-12-14

## 2023-12-12 RX ORDER — SODIUM CHLORIDE 9 MG/ML
1000 INJECTION, SOLUTION INTRAVENOUS
Refills: 0 | Status: DISCONTINUED | OUTPATIENT
Start: 2023-12-12 | End: 2023-12-12

## 2023-12-12 RX ORDER — PANTOPRAZOLE SODIUM 20 MG/1
40 TABLET, DELAYED RELEASE ORAL DAILY
Refills: 0 | Status: DISCONTINUED | OUTPATIENT
Start: 2023-12-12 | End: 2023-12-13

## 2023-12-12 RX ADMIN — FAMOTIDINE 100 MILLIGRAM(S): 10 INJECTION INTRAVENOUS at 11:34

## 2023-12-12 RX ADMIN — SODIUM CHLORIDE 2300 MILLILITER(S): 9 INJECTION INTRAMUSCULAR; INTRAVENOUS; SUBCUTANEOUS at 13:23

## 2023-12-12 RX ADMIN — ENOXAPARIN SODIUM 40 MILLIGRAM(S): 100 INJECTION SUBCUTANEOUS at 21:15

## 2023-12-12 RX ADMIN — DAPAGLIFLOZIN 10 MILLIGRAM(S): 10 TABLET, FILM COATED ORAL at 14:29

## 2023-12-12 RX ADMIN — ONDANSETRON 4 MILLIGRAM(S): 8 TABLET, FILM COATED ORAL at 11:34

## 2023-12-12 RX ADMIN — FAMOTIDINE 20 MILLIGRAM(S): 10 INJECTION INTRAVENOUS at 12:00

## 2023-12-12 RX ADMIN — SODIUM CHLORIDE 125 MILLILITER(S): 9 INJECTION, SOLUTION INTRAVENOUS at 18:36

## 2023-12-12 RX ADMIN — SODIUM CHLORIDE 1000 MILLILITER(S): 9 INJECTION INTRAMUSCULAR; INTRAVENOUS; SUBCUTANEOUS at 15:08

## 2023-12-12 RX ADMIN — INSULIN HUMAN 5 UNIT(S)/HR: 100 INJECTION, SOLUTION SUBCUTANEOUS at 18:37

## 2023-12-12 RX ADMIN — Medication 3 MILLIGRAM(S): at 21:14

## 2023-12-12 RX ADMIN — SODIUM CHLORIDE 2300 MILLILITER(S): 9 INJECTION INTRAMUSCULAR; INTRAVENOUS; SUBCUTANEOUS at 11:39

## 2023-12-12 NOTE — ED ADULT NURSE REASSESSMENT NOTE - NS ED NURSE REASSESS COMMENT FT1
blood sugar reassessed. Patient had been drinking full sugar gatorade because he was feeling thirsty. . Patient states he wishes to take his at home medication.

## 2023-12-12 NOTE — ED ADULT NURSE REASSESSMENT NOTE - NS ED NURSE REASSESS COMMENT FT1
At this time patient has received 1000ml of NaCL, patient states he still does not feel well. Provider aware

## 2023-12-12 NOTE — H&P ADULT - NS_MD_PANP_GEN_ALL_CORE
YOU MAY BE HAVING LABS APPROXIMATELY 1 WEEK BEFORE YOUR NEXT APPOINTMENT. WE WILL FOLLOW UP WITH YOU REGARDING YOUR LAB RESULTS AT YOUR NEXT APPOINTMENT UNLESS IT IS MEDICALLY NECESSARY TO DO SO SOONER. LABS MAY ALSO BE AVAILABLE FOR YOU TO REVIEW ON [facility] PRIOR TO YOUR APPOINTMENT.    Thank you for allowing us to care for you today. If you need to call for assistance or questions when our clinic is closed, your call will be answered the same day by one of our nursing staff at our call center.  After talking with staff, if you need further assistance, an on call clinician will be contacted to assist you.      Attending and PA/NP shared services statement (NON-critical care):

## 2023-12-12 NOTE — PROGRESS NOTE ADULT - SUBJECTIVE AND OBJECTIVE BOX
Date of entry of this note is equal to the date of services rendered   Patient is a 57y old  Male who presents with a chief complaint of euglycemic DKA (12 Dec 2023 18:50)    BRIEF HOSPITAL COURSE:   Mr. Cross is a 56 YO M w/pmhx of DM on Sitagliptin/Metformin & Dapagliflozin who presents to ED w/complaints of nausea and vomiting for the past two days. Patient was diagnosed w/influenza outpatient and start on Tamiflu. Patient found to have AGMA w/moderate acetone in serum    Events last 24 hours:   - IVF  - Insulin infusion  - States cough has improved   - Ordered UA to assess urinary ketones     Review of Systems:  Negative besides mentioned in HPI     PAST MEDICAL & SURGICAL HISTORY:  Diabetes Type II  Inguinal Hernia  S/P Arthroscopy  Elbow fracture repair    Medications:  oseltamivir 75 milliGRAM(s) Oral two times a day  acetaminophen     Tablet .. 650 milliGRAM(s) Oral every 6 hours PRN  melatonin 3 milliGRAM(s) Oral at bedtime  ondansetron Injectable 4 milliGRAM(s) IV Push every 6 hours PRN  enoxaparin Injectable 40 milliGRAM(s) SubCutaneous every 24 hours  pantoprazole  Injectable 40 milliGRAM(s) IV Push daily  dextrose 50% Injectable 25 Gram(s) IV Push once  insulin regular Infusion 5 Unit(s)/Hr IV Continuous <Continuous>  dextrose 5% + lactated ringers. 1000 milliLiter(s) IV Continuous <Continuous>  chlorhexidine 2% Cloths 1 Application(s) Topical <User Schedule>    ICU Vital Signs Last 24 Hrs  T(C): 36 (12 Dec 2023 10:59), Max: 36 (12 Dec 2023 10:59)  T(F): 96.8 (12 Dec 2023 10:59), Max: 96.8 (12 Dec 2023 10:59)  HR: 95 (12 Dec 2023 19:00) (95 - 112)  BP: 125/69 (12 Dec 2023 19:00) (113/67 - 139/89)  BP(mean): 84 (12 Dec 2023 19:00) (81 - 91)  RR: 17 (12 Dec 2023 19:00) (16 - 21)  SpO2: 100% (12 Dec 2023 19:00) (100% - 100%)  O2 Parameters below as of 12 Dec 2023 19:00  Patient On (Oxygen Delivery Method): room air    ABG - ( 12 Dec 2023 15:05 )  pH, Arterial: 7.21  pH, Blood: x     /  pCO2: 25    /  pO2: 113   / HCO3: 10    / Base Excess: -17.9 /  SaO2: 98.7      I&O's Detail    12 Dec 2023 07:01  -  12 Dec 2023 20:05  --------------------------------------------------------  IN:    dextrose 5% + lactated ringers: 125 mL    Insulin: 5 mL  Total IN: 130 mL    OUT:  Total OUT: 0 mL    Total NET: 130 mL    LABS:                        16.1   7.83  )-----------( 176      ( 12 Dec 2023 11:37 )             50.1     12-12    138  |  104  |  20  ----------------------------<  231<H>  4.5   |  14<L>  |  0.94    Ca    8.6      12 Dec 2023 15:01  Mg     2.0     12-12    TPro  8.6<H>  /  Alb  4.1  /  TBili  0.4  /  DBili  x   /  AST  17  /  ALT  19  /  AlkPhos  57  12-12    POCT Blood Glucose.: 125 mg/dL (12 Dec 2023 19:42)    Urinalysis Basic - ( 12 Dec 2023 15:01 )  Color: x / Appearance: x / SG: x / pH: x  Gluc: 231 mg/dL / Ketone: x  / Bili: x / Urobili: x   Blood: x / Protein: x / Nitrite: x   Leuk Esterase: x / RBC: x / WBC x   Sq Epi: x / Non Sq Epi: x / Bacteria: x    CULTURES:  Rapid RVP Result: Detected (12-12 @ 17:17)    Physical Examination:  General: No acute distress.    HEENT: Pupils equal, reactive to light.  Symmetric.  PULM: Clear to auscultation bilaterally, no significant sputum production  NECK: Supple, no lymphadenopathy, trachea midline  CVS: Regular rate and rhythm, no murmurs, rubs, or gallops  ABD: Soft, nondistended, nontender, normoactive bowel sounds, no masses  EXT: No edema, nontender  SKIN: Warm and well perfused  NEURO: Alert, oriented, interactive, nonfocal     Date of entry of this note is equal to the date of services rendered   Patient is a 57y old  Male who presents with a chief complaint of euglycemic DKA (12 Dec 2023 18:50)    BRIEF HOSPITAL COURSE:   Mr. Cross is a 58 YO M w/pmhx of DM on Sitagliptin/Metformin & Dapagliflozin who presents to ED w/complaints of nausea and vomiting for the past two days. Patient was diagnosed w/influenza outpatient and start on Tamiflu. Patient found to have AGMA w/moderate acetone in serum    Events last 24 hours:   - IVF  - Insulin infusion  - States cough has improved   - Ordered UA to assess urinary ketones     Review of Systems:  Negative besides mentioned in HPI     PAST MEDICAL & SURGICAL HISTORY:  Diabetes Type II  Inguinal Hernia  S/P Arthroscopy  Elbow fracture repair    Medications:  oseltamivir 75 milliGRAM(s) Oral two times a day  acetaminophen     Tablet .. 650 milliGRAM(s) Oral every 6 hours PRN  melatonin 3 milliGRAM(s) Oral at bedtime  ondansetron Injectable 4 milliGRAM(s) IV Push every 6 hours PRN  enoxaparin Injectable 40 milliGRAM(s) SubCutaneous every 24 hours  pantoprazole  Injectable 40 milliGRAM(s) IV Push daily  dextrose 50% Injectable 25 Gram(s) IV Push once  insulin regular Infusion 5 Unit(s)/Hr IV Continuous <Continuous>  dextrose 5% + lactated ringers. 1000 milliLiter(s) IV Continuous <Continuous>  chlorhexidine 2% Cloths 1 Application(s) Topical <User Schedule>    ICU Vital Signs Last 24 Hrs  T(C): 36 (12 Dec 2023 10:59), Max: 36 (12 Dec 2023 10:59)  T(F): 96.8 (12 Dec 2023 10:59), Max: 96.8 (12 Dec 2023 10:59)  HR: 95 (12 Dec 2023 19:00) (95 - 112)  BP: 125/69 (12 Dec 2023 19:00) (113/67 - 139/89)  BP(mean): 84 (12 Dec 2023 19:00) (81 - 91)  RR: 17 (12 Dec 2023 19:00) (16 - 21)  SpO2: 100% (12 Dec 2023 19:00) (100% - 100%)  O2 Parameters below as of 12 Dec 2023 19:00  Patient On (Oxygen Delivery Method): room air    ABG - ( 12 Dec 2023 15:05 )  pH, Arterial: 7.21  pH, Blood: x     /  pCO2: 25    /  pO2: 113   / HCO3: 10    / Base Excess: -17.9 /  SaO2: 98.7      I&O's Detail    12 Dec 2023 07:01  -  12 Dec 2023 20:05  --------------------------------------------------------  IN:    dextrose 5% + lactated ringers: 125 mL    Insulin: 5 mL  Total IN: 130 mL    OUT:  Total OUT: 0 mL    Total NET: 130 mL    LABS:                        16.1   7.83  )-----------( 176      ( 12 Dec 2023 11:37 )             50.1     12-12    138  |  104  |  20  ----------------------------<  231<H>  4.5   |  14<L>  |  0.94    Ca    8.6      12 Dec 2023 15:01  Mg     2.0     12-12    TPro  8.6<H>  /  Alb  4.1  /  TBili  0.4  /  DBili  x   /  AST  17  /  ALT  19  /  AlkPhos  57  12-12    POCT Blood Glucose.: 125 mg/dL (12 Dec 2023 19:42)    Urinalysis Basic - ( 12 Dec 2023 15:01 )  Color: x / Appearance: x / SG: x / pH: x  Gluc: 231 mg/dL / Ketone: x  / Bili: x / Urobili: x   Blood: x / Protein: x / Nitrite: x   Leuk Esterase: x / RBC: x / WBC x   Sq Epi: x / Non Sq Epi: x / Bacteria: x    CULTURES:  Rapid RVP Result: Detected (12-12 @ 17:17)    Physical Examination:  General: No acute distress.    HEENT: Pupils equal, reactive to light.  Symmetric.  PULM: Clear to auscultation bilaterally, no significant sputum production  NECK: Supple, no lymphadenopathy, trachea midline  CVS: Regular rate and rhythm, no murmurs, rubs, or gallops  ABD: Soft, nondistended, nontender, normoactive bowel sounds, no masses  EXT: No edema, nontender  SKIN: Warm and well perfused  NEURO: Alert, oriented, interactive, nonfocal

## 2023-12-12 NOTE — PATIENT PROFILE ADULT - FALL HARM RISK - UNIVERSAL INTERVENTIONS
Bed in lowest position, wheels locked, appropriate side rails in place/Call bell, personal items and telephone in reach/Instruct patient to call for assistance before getting out of bed or chair/Non-slip footwear when patient is out of bed/[place] to call system/Physically safe environment - no spills, clutter or unnecessary equipment/Purposeful Proactive Rounding/Room/bathroom lighting operational, light cord in reach Bed in lowest position, wheels locked, appropriate side rails in place/Call bell, personal items and telephone in reach/Instruct patient to call for assistance before getting out of bed or chair/Non-slip footwear when patient is out of bed/[place] to call system/Physically safe environment - no spills, clutter or unnecessary equipment/Purposeful Proactive Rounding/Room/bathroom lighting operational, light cord in reach

## 2023-12-12 NOTE — PROGRESS NOTE ADULT - ASSESSMENT
Assessment:  Mr. Cross is a 56 YO M w/pmhx of DM on Sitagliptin/Metformin & Dapagliflozin who presents to ED w/complaints of nausea and vomiting for the past two days. Patient was diagnosed w/influenza outpatient and start on Tamiflu. Patient found to have AGMA w/moderate acetone in serum    Problem List:  1. DKA --> type II. Euglycemic in setting of SGLT2-i usage     Plan:  Neuro: Monitor mental status, avoid deliriogenic medications. Pain & fever control as needed    CV: No cardiac disease. Monitor HR and BP     Pulm: Influenza +, had cough, now improving     Renal: Renal function worse than baseline, should improve w/IVF. Strict I/Os, goal UOP >0.5cc/kg/hr. Trend renal function and electrolytes, replete as needed. Avoid nephrotoxic agents    GI: NPO. PPI     ID: Influenza+. Tamiflu. Trend WBC/fevers/procalcitonin     Heme: Lovenox for DVT prophylaxis     Endo: Insulin infusion for DKA. Will titrate as per policy. Needs BMP, mg, phos, acetone q6h. D5+LR IVF since glucose <250. Await for GAP to close in order to transition to long acting insulin. When doing so, will give long acting insulin and keep gtt on for another 2 hours. DKA likely euglycemic and 2/2 acute illness from influenza     CRITICAL CARE TIME SPENT: 32 minutes assessing presenting problems of acute illness, which pose high probability of life threatening deterioration or end organ damage/dysfunction, as well as medical decision making including initiating plan of care, reviewing data, reviewing radiologic exams, discussing with multidisciplinary team,  discussing goals of care with patient/family, and writing this note.  Non-inclusive of procedures performed  Date of entry of this note is equal to the date of services rendered

## 2023-12-12 NOTE — H&P ADULT - HISTORY OF PRESENT ILLNESS
57 /yo M with PMH DM presented to ED for c/o nausea vomiting x 2 days. Pt was found ot be FLU + 2 days ago and started on Tamiflu which he then developed nausea and vomiting and therefore has been unable to take his PO meds. Patient found to be in euglycemia DKA.

## 2023-12-12 NOTE — CHART NOTE - NSCHARTNOTEFT_GEN_A_CORE
Patient has euglycemic DKA  Cannot follow [facility] insulin titration policy. Insulin infusion has been off most of my shift. We need to keep insulin on and increase dextrose administration in order to fix underlying pathology.

## 2023-12-12 NOTE — ED ADULT NURSE NOTE - NSFALLUNIVINTERV_ED_ALL_ED
Bed/Stretcher in lowest position, wheels locked, appropriate side rails in place/Call bell, personal items and telephone in reach/Instruct patient to call for assistance before getting out of bed/chair/stretcher/Non-slip footwear applied when patient is off stretcher/[place] to call system/Physically safe environment - no spills, clutter or unnecessary equipment/Purposeful proactive rounding/Room/bathroom lighting operational, light cord in reach Bed/Stretcher in lowest position, wheels locked, appropriate side rails in place/Call bell, personal items and telephone in reach/Instruct patient to call for assistance before getting out of bed/chair/stretcher/Non-slip footwear applied when patient is off stretcher/[place] to call system/Physically safe environment - no spills, clutter or unnecessary equipment/Purposeful proactive rounding/Room/bathroom lighting operational, light cord in reach

## 2023-12-12 NOTE — ED ADULT NURSE NOTE - HOW PATIENT ADDRESSED, PROFILE
Vancomycin trough therapeutic @ 15.1 and can be used to guide therapy. No changes.    Continue current regimen.    Pharmacy will continue to monitor and make adjustments as needed.    Tammy Young, PharmD  [phone]     [address]
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
## 2023-12-12 NOTE — ED PROVIDER NOTE - OBJECTIVE STATEMENT
57-year-old male with a past history of diabetes came to the emergency room chief complaint of nausea and vomiting patient has been noncompliant with his diabetes medications for the past 3 days 57-year-old male with a past history of diabetes came to the emergency room chief complaint of nausea and vomiting patient has been noncompliant with his diabetes medications for the past 3 days  Patient's fingerstick was 246 patient also tested positive for flu 2 days ago and was started on tamiflu

## 2023-12-12 NOTE — ED ADULT NURSE REASSESSMENT NOTE - NS ED NURSE REASSESS COMMENT FT1
1532: Blood sugar 202- provider states to repeat blood sugar in 30 minutes prior to giving 4 units of insulin.

## 2023-12-12 NOTE — H&P ADULT - ASSESSMENT
58 y/o M with PMH DM on janumet and farxiga presented to [facility] for nausea vomiting x 2 days. FLu + and started on Tamiflu on Sunday and unable to po since and therefore missed his PO meds. Patient admitted to ICU for further management of:    Euglycemic DKA  hypovolemia in setting of nausea and vomiting   influenza     PLAN:  admit to ICU  NPO except for meds  HOLD ORAL DIABETIC MEDS  Start insulin gtt and titrate per DKA protocol  D5LR @ 125 while on insulin gtt until GAP closes  Monitor CMP Q 6 hours  Monitor blood gluocse Q 1 hour  Ondansetron prn for nausea and vomiting  monitor WBC and fever curve  Contact precautions  Resp status stable, no needs for ozygen supplementation at this time  Monitro Spo2 and minatain > 92%  Continue tamiflu as toelrated   GI ppi  DVT ppx  OOB as tolerated  Monitor I&O  Endocrine consult      Patient seen and evaluated with Dr Draper- agrees with above stated plan of care      58 y/o M with PMH DM on janumet and farxiga presented to [facility] for nausea vomiting x 2 days. FLu + and started on Tamiflu on Sunday and unable to po since and therefore missed his PO meds. Patient admitted to ICU for further management of:    Euglycemic DKA  hypovolemia in setting of nausea and vomiting   influenza     PLAN:  admit to ICU  NPO except for meds  HOLD ORAL DIABETIC MEDS  Start insulin gtt and titrate per DKA protocol  D5LR @ 125 while on insulin gtt until GAP closes  Monitor CMP Q 6 hours  Monitor blood gluocse Q 1 hour  Ondansetron prn for nausea and vomiting  monitor WBC and fever curve  Contact precautions  Resp status stable, no needs for ozygen supplementation at this time  Monitro Spo2 and minatain > 92%  Continue tamiflu as toelrated   GI ppi  DVT ppx  OOB as tolerated  Monitor I&O  Endocrine consult      Patient seen and evaluated with Dr Draper- agrees with above stated plan of care

## 2023-12-12 NOTE — H&P ADULT - NSHPREVIEWOFSYSTEMS_GEN_ALL_CORE
Constitutional: + Fever, - Chills, + Anorexia, + Fatigue  Eyes: - Discharge, - Irritation, - Redness, - Visual changes, - Light sensitivity  NOSE: - Congestion, - Bloody nose  CV: - Palpitations, - Chest Pain, - Edema   RESP:  + Cough, - Shortness of Breath, + Dyspnea on Exertion,   GI: - Diarrhea, - Constipation,  + Nausea, + Vomiting,   : - Dysuria, -Frequency, - Hematuria, - Hesitancy, - Incontinence,   MSK: - Myalgias, - Arthralgias, - Weakness, - Deformities, - Injuries  SKIN: - Color change, - Rash, - Swelling, - Bruises, - Wounds  NEURO: - Change in behavior, - Dec. Alertness, + Headache, - Dizziness, - Numbness/Tingling,

## 2023-12-12 NOTE — H&P ADULT - NS ATTEND AMEND GEN_ALL_CORE FT
IMP: This is a 57 yr odl man  with DM on janumet and farxiga presented to [facility] for nausea vomiting x 2 days. FLu + and started on Tamiflu on Sunday and unable to po since and therefore missed his PO meds. Patient admitted to ICU for further management of: S/P 3+ L of IVF        ASSESSMENT   - Euglycemic DKA  - HAGMA  - Influenza A  - Hypovolemia in setting of nausea and vomiting          PLAN:  - Admit to ICU  - NPO except for meds  - HOLD ORAL DIABETIC MEDS  - Start insulin gtt and titrate per DKA protocol  - D5LR @ 125 while on insulin gtt until GAP closes  - Monitor CMP Q 6 hours  - Monitor blood gluocse Q 1 hour  - Ondansetron prn for nausea and vomiting  - Monitor WBC and fever curve  - Contact precautions  - Resp status stable, no needs for ozygen supplementation at this time  - Monitro Spo2 and minatain > 92%  - Continue tamiflu as tolerated   - DVT GI prophy   - Endocrine consult    spoke with wife at bedside and ER attending IMP: This is a 57 yr odl man  with DM on janumet and farxiga presented to [facility] for nausea vomiting x 2 days. FLu + and started on Tamiflu on Sunday and unable to po since and therefore missed his PO meds. Patient admitted to ICU for further management of: S/P 3+ L of IVF        ASSESSMENT   - Euglycemic DKA  - HAGMA  - Influenza A  - Hypovolemia in setting of nausea and vomiting          PLAN:  - Admit to ICU  - NPO except for meds  - HOLD ORAL DIABETIC MEDS  - Start insulin gtt and titrate per DKA protocol  - D5LR @ 125 while on insulin gtt until GAP closes  - Monitor CMP Q 6 hours  - Monitor blood gluocse Q 1 hour  - Ondansetron prn for nausea and vomiting  - Monitor WBC and fever curve  - Contact precautions  - Resp status stable, no needs for ozygen supplementation at this time  - Monitro Spo2 and minatain > 92%  - Continue tamiflu as tolerated   - DVT GI prophy   - Endocrine consult    spoke with wife at bedside and ER attending

## 2023-12-12 NOTE — H&P ADULT - NSHPPHYSICALEXAM_GEN_ALL_CORE
PE:   GEN: Awake, alert, interactive, NAD, non-toxic appearing.   HEAD: Atraumatic  EYES: Sclera white, conjunctiva pink, PERRLA  MOUTH: dry MM  CARDIAC: Reg rate and rhythm, S1,S2, no murmur/rub/gallop. Strong central and peripheral pulses, Brisk cap refill, no evident pedal edema.   RESP: No distress noted. L/S clear = Bilat without accessory muscle use, wheeze, rhonchi, rales.   ABD: soft, supple, non-tender, no guarding. BS x 4, normoactive.   NEURO: AOx3, CN II-XII grossly intact without focal deficit.   MSK: Moving all extremities with no apparent deformities.   SKIN: Warm, dry, normal color, without apparent rashes.

## 2023-12-12 NOTE — PROGRESS NOTE ADULT - NS ATTEND AMEND GEN_ALL_CORE FT
IMP: Mr. Cross is a 57 yr old man with  DM on Sitagliptin/Metformin & Dapagliflozin who presents to ED w/complaints of nausea and vomiting for the past two days. Patient was diagnosed w/influenza outpatient and start on Tamiflu. Patient found to have AGMA w/moderate acetone in serum    Problem List:  1. DKA --> type II. Euglycemic in setting of SGLT2-i usage     Plan:  Neuro: Monitor mental status, avoid deliriogenic medications. Pain & fever control as needed    CV: No cardiac disease. Monitor HR and BP     Pulm: Influenza +, had cough, now improving     Renal: Renal function worse than baseline, should improve w/IVF. Strict I/Os, goal UOP >0.5cc/kg/hr. Trend renal function and electrolytes, replete as needed. Avoid nephrotoxic agents    GI: NPO. PPI     ID: Influenza+. Tamiflu. Trend WBC/fevers/procalcitonin     Heme: Lovenox for DVT prophylaxis     Endo: Insulin infusion for DKA. Will titrate as per policy. Needs BMP, mg, phos, acetone q6h. D5+LR IVF since glucose <250. Await for GAP to close in order to transition to long acting insulin. When doing so, will give long acting insulin and keep gtt on for another 2 hours. DKA likely euglycemic and 2/2 acute illness from influenza     CRITICAL CARE TIME SPENT: 32 minutes assessing presenting problems of acute illness, which pose high probability of life threatening deterioration or end organ damage/dysfunction, as well as medical decision making including initiating plan of care, reviewing data, reviewing radiologic exams, discussing with multidisciplinary team,  discussing goals of care with patient/family, and writing this note.  Non-inclusive of procedures performed  Date of entry of this note is equal to the date of services rendered

## 2023-12-12 NOTE — ED PROVIDER NOTE - CLINICAL SUMMARY MEDICAL DECISION MAKING FREE TEXT BOX
57-year-old male with a past history of diabetes came to the emergency room chief complaint of nausea and vomiting patient has been noncompliant with his diabetes medications for the past 3 days  Patient's fingerstick was 246 patient also tested positive for flu 2 days ago and was started on tamiflu 57-year-old male with a past history of diabetes came to the emergency room chief complaint of nausea and vomiting patient has been noncompliant with his diabetes medications for the past 3 days  Patient's fingerstick was 246 patient also tested positive for flu 2 days ago and was started on tamiflu  Patient's initial bicarb 11 repeat bicarb 14 pH 7.2 from the arterial blood gas after 2 L bolus ICU Dr. Rae informed patient continue to get fluid the last fingerstick was 161 57-year-old male with a past history of diabetes came to the emergency room chief complaint of nausea and vomiting patient has been noncompliant with his diabetes medications for the past 3 days  Patient's fingerstick was 246 patient also tested positive for flu 2 days ago and was started on tamiflu  Patient's initial bicarb 11 repeat bicarb 14 pH 7.2 from the arterial blood gas after 2 L bolus ICU Dr. Oswald informed patient continue to get fluid the last fingerstick was 161

## 2023-12-13 LAB
A1C WITH ESTIMATED AVERAGE GLUCOSE RESULT: 7.4 % — HIGH (ref 4–5.6)
A1C WITH ESTIMATED AVERAGE GLUCOSE RESULT: 7.4 % — HIGH (ref 4–5.6)
ALBUMIN SERPL ELPH-MCNC: 2.7 G/DL — LOW (ref 3.3–5)
ALBUMIN SERPL ELPH-MCNC: 2.7 G/DL — LOW (ref 3.3–5)
ALP SERPL-CCNC: 42 U/L — SIGNIFICANT CHANGE UP (ref 40–120)
ALP SERPL-CCNC: 42 U/L — SIGNIFICANT CHANGE UP (ref 40–120)
ALT FLD-CCNC: 16 U/L — SIGNIFICANT CHANGE UP (ref 10–45)
ALT FLD-CCNC: 16 U/L — SIGNIFICANT CHANGE UP (ref 10–45)
ANION GAP SERPL CALC-SCNC: 10 MMOL/L — SIGNIFICANT CHANGE UP (ref 5–17)
ANION GAP SERPL CALC-SCNC: 10 MMOL/L — SIGNIFICANT CHANGE UP (ref 5–17)
ANION GAP SERPL CALC-SCNC: 12 MMOL/L — SIGNIFICANT CHANGE UP (ref 5–17)
ANION GAP SERPL CALC-SCNC: 12 MMOL/L — SIGNIFICANT CHANGE UP (ref 5–17)
ANION GAP SERPL CALC-SCNC: 16 MMOL/L — SIGNIFICANT CHANGE UP (ref 5–17)
ANION GAP SERPL CALC-SCNC: 16 MMOL/L — SIGNIFICANT CHANGE UP (ref 5–17)
ANION GAP SERPL CALC-SCNC: 21 MMOL/L — HIGH (ref 5–17)
ANION GAP SERPL CALC-SCNC: 21 MMOL/L — HIGH (ref 5–17)
ANION GAP SERPL CALC-SCNC: 9 MMOL/L — SIGNIFICANT CHANGE UP (ref 5–17)
ANION GAP SERPL CALC-SCNC: 9 MMOL/L — SIGNIFICANT CHANGE UP (ref 5–17)
APPEARANCE UR: ABNORMAL
APPEARANCE UR: ABNORMAL
AST SERPL-CCNC: 17 U/L — SIGNIFICANT CHANGE UP (ref 10–40)
AST SERPL-CCNC: 17 U/L — SIGNIFICANT CHANGE UP (ref 10–40)
B-OH-BUTYR SERPL-SCNC: 5.9 MMOL/L — HIGH
B-OH-BUTYR SERPL-SCNC: 5.9 MMOL/L — HIGH
BACTERIA # UR AUTO: ABNORMAL /HPF
BACTERIA # UR AUTO: ABNORMAL /HPF
BASE EXCESS BLDA CALC-SCNC: -7.9 MMOL/L — LOW (ref -2–3)
BASE EXCESS BLDA CALC-SCNC: -7.9 MMOL/L — LOW (ref -2–3)
BILIRUB SERPL-MCNC: 0.3 MG/DL — SIGNIFICANT CHANGE UP (ref 0.2–1.2)
BILIRUB SERPL-MCNC: 0.3 MG/DL — SIGNIFICANT CHANGE UP (ref 0.2–1.2)
BILIRUB UR-MCNC: NEGATIVE — SIGNIFICANT CHANGE UP
BILIRUB UR-MCNC: NEGATIVE — SIGNIFICANT CHANGE UP
BLOOD GAS COMMENTS ARTERIAL: SIGNIFICANT CHANGE UP
BLOOD GAS COMMENTS ARTERIAL: SIGNIFICANT CHANGE UP
BUN SERPL-MCNC: 11 MG/DL — SIGNIFICANT CHANGE UP (ref 7–23)
BUN SERPL-MCNC: 11 MG/DL — SIGNIFICANT CHANGE UP (ref 7–23)
BUN SERPL-MCNC: 13 MG/DL — SIGNIFICANT CHANGE UP (ref 7–23)
BUN SERPL-MCNC: 13 MG/DL — SIGNIFICANT CHANGE UP (ref 7–23)
BUN SERPL-MCNC: 15 MG/DL — SIGNIFICANT CHANGE UP (ref 7–23)
BUN SERPL-MCNC: 15 MG/DL — SIGNIFICANT CHANGE UP (ref 7–23)
BUN SERPL-MCNC: 16 MG/DL — SIGNIFICANT CHANGE UP (ref 7–23)
BUN SERPL-MCNC: 16 MG/DL — SIGNIFICANT CHANGE UP (ref 7–23)
BUN SERPL-MCNC: 9 MG/DL — SIGNIFICANT CHANGE UP (ref 7–23)
BUN SERPL-MCNC: 9 MG/DL — SIGNIFICANT CHANGE UP (ref 7–23)
CALCIUM SERPL-MCNC: 8.3 MG/DL — LOW (ref 8.4–10.5)
CALCIUM SERPL-MCNC: 8.3 MG/DL — LOW (ref 8.4–10.5)
CALCIUM SERPL-MCNC: 8.4 MG/DL — SIGNIFICANT CHANGE UP (ref 8.4–10.5)
CALCIUM SERPL-MCNC: 8.4 MG/DL — SIGNIFICANT CHANGE UP (ref 8.4–10.5)
CALCIUM SERPL-MCNC: 8.5 MG/DL — SIGNIFICANT CHANGE UP (ref 8.4–10.5)
CALCIUM SERPL-MCNC: 8.5 MG/DL — SIGNIFICANT CHANGE UP (ref 8.4–10.5)
CALCIUM SERPL-MCNC: 8.7 MG/DL — SIGNIFICANT CHANGE UP (ref 8.4–10.5)
CHLORIDE SERPL-SCNC: 105 MMOL/L — SIGNIFICANT CHANGE UP (ref 96–108)
CHLORIDE SERPL-SCNC: 105 MMOL/L — SIGNIFICANT CHANGE UP (ref 96–108)
CHLORIDE SERPL-SCNC: 106 MMOL/L — SIGNIFICANT CHANGE UP (ref 96–108)
CHLORIDE SERPL-SCNC: 106 MMOL/L — SIGNIFICANT CHANGE UP (ref 96–108)
CHLORIDE SERPL-SCNC: 107 MMOL/L — SIGNIFICANT CHANGE UP (ref 96–108)
CHLORIDE SERPL-SCNC: 107 MMOL/L — SIGNIFICANT CHANGE UP (ref 96–108)
CHLORIDE SERPL-SCNC: 108 MMOL/L — SIGNIFICANT CHANGE UP (ref 96–108)
CHLORIDE SERPL-SCNC: 108 MMOL/L — SIGNIFICANT CHANGE UP (ref 96–108)
CHLORIDE SERPL-SCNC: 109 MMOL/L — HIGH (ref 96–108)
CHLORIDE SERPL-SCNC: 109 MMOL/L — HIGH (ref 96–108)
CO2 BLDA-SCNC: 19 MMOL/L — SIGNIFICANT CHANGE UP (ref 19–24)
CO2 BLDA-SCNC: 19 MMOL/L — SIGNIFICANT CHANGE UP (ref 19–24)
CO2 SERPL-SCNC: 15 MMOL/L — LOW (ref 22–31)
CO2 SERPL-SCNC: 15 MMOL/L — LOW (ref 22–31)
CO2 SERPL-SCNC: 19 MMOL/L — LOW (ref 22–31)
CO2 SERPL-SCNC: 19 MMOL/L — LOW (ref 22–31)
CO2 SERPL-SCNC: 22 MMOL/L — SIGNIFICANT CHANGE UP (ref 22–31)
CO2 SERPL-SCNC: 22 MMOL/L — SIGNIFICANT CHANGE UP (ref 22–31)
CO2 SERPL-SCNC: 24 MMOL/L — SIGNIFICANT CHANGE UP (ref 22–31)
COLOR SPEC: YELLOW — SIGNIFICANT CHANGE UP
COLOR SPEC: YELLOW — SIGNIFICANT CHANGE UP
CREAT SERPL-MCNC: 0.77 MG/DL — SIGNIFICANT CHANGE UP (ref 0.5–1.3)
CREAT SERPL-MCNC: 0.77 MG/DL — SIGNIFICANT CHANGE UP (ref 0.5–1.3)
CREAT SERPL-MCNC: 0.8 MG/DL — SIGNIFICANT CHANGE UP (ref 0.5–1.3)
CREAT SERPL-MCNC: 0.8 MG/DL — SIGNIFICANT CHANGE UP (ref 0.5–1.3)
CREAT SERPL-MCNC: 0.85 MG/DL — SIGNIFICANT CHANGE UP (ref 0.5–1.3)
CREAT SERPL-MCNC: 0.85 MG/DL — SIGNIFICANT CHANGE UP (ref 0.5–1.3)
CREAT SERPL-MCNC: 0.91 MG/DL — SIGNIFICANT CHANGE UP (ref 0.5–1.3)
CREAT SERPL-MCNC: 0.91 MG/DL — SIGNIFICANT CHANGE UP (ref 0.5–1.3)
CREAT SERPL-MCNC: 1.02 MG/DL — SIGNIFICANT CHANGE UP (ref 0.5–1.3)
CREAT SERPL-MCNC: 1.02 MG/DL — SIGNIFICANT CHANGE UP (ref 0.5–1.3)
DIFF PNL FLD: NEGATIVE — SIGNIFICANT CHANGE UP
DIFF PNL FLD: NEGATIVE — SIGNIFICANT CHANGE UP
EGFR: 100 ML/MIN/1.73M2 — SIGNIFICANT CHANGE UP
EGFR: 100 ML/MIN/1.73M2 — SIGNIFICANT CHANGE UP
EGFR: 103 ML/MIN/1.73M2 — SIGNIFICANT CHANGE UP
EGFR: 103 ML/MIN/1.73M2 — SIGNIFICANT CHANGE UP
EGFR: 105 ML/MIN/1.73M2 — SIGNIFICANT CHANGE UP
EGFR: 105 ML/MIN/1.73M2 — SIGNIFICANT CHANGE UP
EGFR: 106 ML/MIN/1.73M2 — SIGNIFICANT CHANGE UP
EGFR: 106 ML/MIN/1.73M2 — SIGNIFICANT CHANGE UP
EGFR: 87 ML/MIN/1.73M2 — SIGNIFICANT CHANGE UP
EGFR: 87 ML/MIN/1.73M2 — SIGNIFICANT CHANGE UP
EPI CELLS # UR: 0 — SIGNIFICANT CHANGE UP
EPI CELLS # UR: 0 — SIGNIFICANT CHANGE UP
ESTIMATED AVERAGE GLUCOSE: 166 MG/DL — HIGH (ref 68–114)
ESTIMATED AVERAGE GLUCOSE: 166 MG/DL — HIGH (ref 68–114)
GAS PNL BLDA: SIGNIFICANT CHANGE UP
GAS PNL BLDA: SIGNIFICANT CHANGE UP
GLUCOSE BLDC GLUCOMTR-MCNC: 107 MG/DL — HIGH (ref 70–99)
GLUCOSE BLDC GLUCOMTR-MCNC: 107 MG/DL — HIGH (ref 70–99)
GLUCOSE BLDC GLUCOMTR-MCNC: 120 MG/DL — HIGH (ref 70–99)
GLUCOSE BLDC GLUCOMTR-MCNC: 120 MG/DL — HIGH (ref 70–99)
GLUCOSE BLDC GLUCOMTR-MCNC: 125 MG/DL — HIGH (ref 70–99)
GLUCOSE BLDC GLUCOMTR-MCNC: 125 MG/DL — HIGH (ref 70–99)
GLUCOSE BLDC GLUCOMTR-MCNC: 128 MG/DL — HIGH (ref 70–99)
GLUCOSE BLDC GLUCOMTR-MCNC: 128 MG/DL — HIGH (ref 70–99)
GLUCOSE BLDC GLUCOMTR-MCNC: 130 MG/DL — HIGH (ref 70–99)
GLUCOSE BLDC GLUCOMTR-MCNC: 144 MG/DL — HIGH (ref 70–99)
GLUCOSE BLDC GLUCOMTR-MCNC: 144 MG/DL — HIGH (ref 70–99)
GLUCOSE BLDC GLUCOMTR-MCNC: 147 MG/DL — HIGH (ref 70–99)
GLUCOSE BLDC GLUCOMTR-MCNC: 152 MG/DL — HIGH (ref 70–99)
GLUCOSE BLDC GLUCOMTR-MCNC: 160 MG/DL — HIGH (ref 70–99)
GLUCOSE BLDC GLUCOMTR-MCNC: 160 MG/DL — HIGH (ref 70–99)
GLUCOSE BLDC GLUCOMTR-MCNC: 161 MG/DL — HIGH (ref 70–99)
GLUCOSE BLDC GLUCOMTR-MCNC: 161 MG/DL — HIGH (ref 70–99)
GLUCOSE BLDC GLUCOMTR-MCNC: 165 MG/DL — HIGH (ref 70–99)
GLUCOSE BLDC GLUCOMTR-MCNC: 174 MG/DL — HIGH (ref 70–99)
GLUCOSE BLDC GLUCOMTR-MCNC: 204 MG/DL — HIGH (ref 70–99)
GLUCOSE BLDC GLUCOMTR-MCNC: 204 MG/DL — HIGH (ref 70–99)
GLUCOSE BLDC GLUCOMTR-MCNC: 217 MG/DL — HIGH (ref 70–99)
GLUCOSE BLDC GLUCOMTR-MCNC: 217 MG/DL — HIGH (ref 70–99)
GLUCOSE BLDC GLUCOMTR-MCNC: 240 MG/DL — HIGH (ref 70–99)
GLUCOSE BLDC GLUCOMTR-MCNC: 240 MG/DL — HIGH (ref 70–99)
GLUCOSE BLDC GLUCOMTR-MCNC: 99 MG/DL — SIGNIFICANT CHANGE UP (ref 70–99)
GLUCOSE BLDC GLUCOMTR-MCNC: 99 MG/DL — SIGNIFICANT CHANGE UP (ref 70–99)
GLUCOSE SERPL-MCNC: 139 MG/DL — HIGH (ref 70–99)
GLUCOSE SERPL-MCNC: 139 MG/DL — HIGH (ref 70–99)
GLUCOSE SERPL-MCNC: 147 MG/DL — HIGH (ref 70–99)
GLUCOSE SERPL-MCNC: 147 MG/DL — HIGH (ref 70–99)
GLUCOSE SERPL-MCNC: 174 MG/DL — HIGH (ref 70–99)
GLUCOSE SERPL-MCNC: 174 MG/DL — HIGH (ref 70–99)
GLUCOSE SERPL-MCNC: 230 MG/DL — HIGH (ref 70–99)
GLUCOSE SERPL-MCNC: 230 MG/DL — HIGH (ref 70–99)
GLUCOSE SERPL-MCNC: 285 MG/DL — HIGH (ref 70–99)
GLUCOSE SERPL-MCNC: 285 MG/DL — HIGH (ref 70–99)
GLUCOSE UR QL: >=1000 MG/DL
GLUCOSE UR QL: >=1000 MG/DL
GRAN CASTS # UR COMP ASSIST: 1 — SIGNIFICANT CHANGE UP
GRAN CASTS # UR COMP ASSIST: 1 — SIGNIFICANT CHANGE UP
HCO3 BLDA-SCNC: 18 MMOL/L — LOW (ref 21–28)
HCO3 BLDA-SCNC: 18 MMOL/L — LOW (ref 21–28)
HCT VFR BLD CALC: 35.8 % — LOW (ref 39–50)
HCT VFR BLD CALC: 35.8 % — LOW (ref 39–50)
HCT VFR BLD CALC: 37.9 % — LOW (ref 39–50)
HCT VFR BLD CALC: 37.9 % — LOW (ref 39–50)
HGB BLD-MCNC: 12.5 G/DL — LOW (ref 13–17)
HGB BLD-MCNC: 12.5 G/DL — LOW (ref 13–17)
HGB BLD-MCNC: 12.9 G/DL — LOW (ref 13–17)
HGB BLD-MCNC: 12.9 G/DL — LOW (ref 13–17)
HOROWITZ INDEX BLDA+IHG-RTO: 21 — SIGNIFICANT CHANGE UP
HOROWITZ INDEX BLDA+IHG-RTO: 21 — SIGNIFICANT CHANGE UP
HYALINE CASTS # UR AUTO: PRESENT
HYALINE CASTS # UR AUTO: PRESENT
KETONES UR-MCNC: >=160 MG/DL
KETONES UR-MCNC: >=160 MG/DL
LEUKOCYTE ESTERASE UR-ACNC: NEGATIVE — SIGNIFICANT CHANGE UP
LEUKOCYTE ESTERASE UR-ACNC: NEGATIVE — SIGNIFICANT CHANGE UP
MAGNESIUM SERPL-MCNC: 1.7 MG/DL — SIGNIFICANT CHANGE UP (ref 1.6–2.6)
MAGNESIUM SERPL-MCNC: 1.7 MG/DL — SIGNIFICANT CHANGE UP (ref 1.6–2.6)
MAGNESIUM SERPL-MCNC: 1.8 MG/DL — SIGNIFICANT CHANGE UP (ref 1.6–2.6)
MAGNESIUM SERPL-MCNC: 1.8 MG/DL — SIGNIFICANT CHANGE UP (ref 1.6–2.6)
MAGNESIUM SERPL-MCNC: 1.9 MG/DL — SIGNIFICANT CHANGE UP (ref 1.6–2.6)
MAGNESIUM SERPL-MCNC: 1.9 MG/DL — SIGNIFICANT CHANGE UP (ref 1.6–2.6)
MAGNESIUM SERPL-MCNC: 2 MG/DL — SIGNIFICANT CHANGE UP (ref 1.6–2.6)
MAGNESIUM SERPL-MCNC: 2 MG/DL — SIGNIFICANT CHANGE UP (ref 1.6–2.6)
MAGNESIUM SERPL-MCNC: 2.1 MG/DL — SIGNIFICANT CHANGE UP (ref 1.6–2.6)
MAGNESIUM SERPL-MCNC: 2.1 MG/DL — SIGNIFICANT CHANGE UP (ref 1.6–2.6)
MCHC RBC-ENTMCNC: 32.2 PG — SIGNIFICANT CHANGE UP (ref 27–34)
MCHC RBC-ENTMCNC: 32.2 PG — SIGNIFICANT CHANGE UP (ref 27–34)
MCHC RBC-ENTMCNC: 32.7 PG — SIGNIFICANT CHANGE UP (ref 27–34)
MCHC RBC-ENTMCNC: 32.7 PG — SIGNIFICANT CHANGE UP (ref 27–34)
MCHC RBC-ENTMCNC: 34 GM/DL — SIGNIFICANT CHANGE UP (ref 32–36)
MCHC RBC-ENTMCNC: 34 GM/DL — SIGNIFICANT CHANGE UP (ref 32–36)
MCHC RBC-ENTMCNC: 34.9 GM/DL — SIGNIFICANT CHANGE UP (ref 32–36)
MCHC RBC-ENTMCNC: 34.9 GM/DL — SIGNIFICANT CHANGE UP (ref 32–36)
MCV RBC AUTO: 93.7 FL — SIGNIFICANT CHANGE UP (ref 80–100)
MCV RBC AUTO: 93.7 FL — SIGNIFICANT CHANGE UP (ref 80–100)
MCV RBC AUTO: 94.5 FL — SIGNIFICANT CHANGE UP (ref 80–100)
MCV RBC AUTO: 94.5 FL — SIGNIFICANT CHANGE UP (ref 80–100)
NITRITE UR-MCNC: NEGATIVE — SIGNIFICANT CHANGE UP
NITRITE UR-MCNC: NEGATIVE — SIGNIFICANT CHANGE UP
NRBC # BLD: 0 /100 WBCS — SIGNIFICANT CHANGE UP (ref 0–0)
PCO2 BLDA: 32 MMHG — LOW (ref 35–48)
PCO2 BLDA: 32 MMHG — LOW (ref 35–48)
PH BLDA: 7.35 — SIGNIFICANT CHANGE UP (ref 7.35–7.45)
PH BLDA: 7.35 — SIGNIFICANT CHANGE UP (ref 7.35–7.45)
PH UR: 5 — SIGNIFICANT CHANGE UP (ref 5–8)
PH UR: 5 — SIGNIFICANT CHANGE UP (ref 5–8)
PHOSPHATE SERPL-MCNC: 1.2 MG/DL — LOW (ref 2.5–4.5)
PHOSPHATE SERPL-MCNC: 1.2 MG/DL — LOW (ref 2.5–4.5)
PHOSPHATE SERPL-MCNC: 2 MG/DL — LOW (ref 2.5–4.5)
PHOSPHATE SERPL-MCNC: 2 MG/DL — LOW (ref 2.5–4.5)
PHOSPHATE SERPL-MCNC: 2.1 MG/DL — LOW (ref 2.5–4.5)
PHOSPHATE SERPL-MCNC: 2.1 MG/DL — LOW (ref 2.5–4.5)
PHOSPHATE SERPL-MCNC: 2.3 MG/DL — LOW (ref 2.5–4.5)
PHOSPHATE SERPL-MCNC: 2.3 MG/DL — LOW (ref 2.5–4.5)
PLATELET # BLD AUTO: 132 K/UL — LOW (ref 150–400)
PLATELET # BLD AUTO: 132 K/UL — LOW (ref 150–400)
PLATELET # BLD AUTO: 162 K/UL — SIGNIFICANT CHANGE UP (ref 150–400)
PLATELET # BLD AUTO: 162 K/UL — SIGNIFICANT CHANGE UP (ref 150–400)
PO2 BLDA: 98 MMHG — SIGNIFICANT CHANGE UP (ref 83–108)
PO2 BLDA: 98 MMHG — SIGNIFICANT CHANGE UP (ref 83–108)
POTASSIUM SERPL-MCNC: 3.4 MMOL/L — LOW (ref 3.5–5.3)
POTASSIUM SERPL-MCNC: 3.4 MMOL/L — LOW (ref 3.5–5.3)
POTASSIUM SERPL-MCNC: 3.8 MMOL/L — SIGNIFICANT CHANGE UP (ref 3.5–5.3)
POTASSIUM SERPL-MCNC: 3.8 MMOL/L — SIGNIFICANT CHANGE UP (ref 3.5–5.3)
POTASSIUM SERPL-MCNC: 3.9 MMOL/L — SIGNIFICANT CHANGE UP (ref 3.5–5.3)
POTASSIUM SERPL-MCNC: 3.9 MMOL/L — SIGNIFICANT CHANGE UP (ref 3.5–5.3)
POTASSIUM SERPL-MCNC: 4 MMOL/L — SIGNIFICANT CHANGE UP (ref 3.5–5.3)
POTASSIUM SERPL-MCNC: 4 MMOL/L — SIGNIFICANT CHANGE UP (ref 3.5–5.3)
POTASSIUM SERPL-MCNC: 4.2 MMOL/L — SIGNIFICANT CHANGE UP (ref 3.5–5.3)
POTASSIUM SERPL-MCNC: 4.2 MMOL/L — SIGNIFICANT CHANGE UP (ref 3.5–5.3)
POTASSIUM SERPL-SCNC: 3.4 MMOL/L — LOW (ref 3.5–5.3)
POTASSIUM SERPL-SCNC: 3.4 MMOL/L — LOW (ref 3.5–5.3)
POTASSIUM SERPL-SCNC: 3.8 MMOL/L — SIGNIFICANT CHANGE UP (ref 3.5–5.3)
POTASSIUM SERPL-SCNC: 3.8 MMOL/L — SIGNIFICANT CHANGE UP (ref 3.5–5.3)
POTASSIUM SERPL-SCNC: 3.9 MMOL/L — SIGNIFICANT CHANGE UP (ref 3.5–5.3)
POTASSIUM SERPL-SCNC: 3.9 MMOL/L — SIGNIFICANT CHANGE UP (ref 3.5–5.3)
POTASSIUM SERPL-SCNC: 4 MMOL/L — SIGNIFICANT CHANGE UP (ref 3.5–5.3)
POTASSIUM SERPL-SCNC: 4 MMOL/L — SIGNIFICANT CHANGE UP (ref 3.5–5.3)
POTASSIUM SERPL-SCNC: 4.2 MMOL/L — SIGNIFICANT CHANGE UP (ref 3.5–5.3)
POTASSIUM SERPL-SCNC: 4.2 MMOL/L — SIGNIFICANT CHANGE UP (ref 3.5–5.3)
PROT SERPL-MCNC: 5.8 G/DL — LOW (ref 6–8.3)
PROT SERPL-MCNC: 5.8 G/DL — LOW (ref 6–8.3)
PROT UR-MCNC: 30 MG/DL
PROT UR-MCNC: 30 MG/DL
RBC # BLD: 3.82 M/UL — LOW (ref 4.2–5.8)
RBC # BLD: 3.82 M/UL — LOW (ref 4.2–5.8)
RBC # BLD: 4.01 M/UL — LOW (ref 4.2–5.8)
RBC # BLD: 4.01 M/UL — LOW (ref 4.2–5.8)
RBC # FLD: 13.5 % — SIGNIFICANT CHANGE UP (ref 10.3–14.5)
RBC # FLD: 13.5 % — SIGNIFICANT CHANGE UP (ref 10.3–14.5)
RBC # FLD: 13.7 % — SIGNIFICANT CHANGE UP (ref 10.3–14.5)
RBC # FLD: 13.7 % — SIGNIFICANT CHANGE UP (ref 10.3–14.5)
RBC CASTS # UR COMP ASSIST: 0 /HPF — SIGNIFICANT CHANGE UP (ref 0–4)
RBC CASTS # UR COMP ASSIST: 0 /HPF — SIGNIFICANT CHANGE UP (ref 0–4)
SAO2 % BLDA: 98.6 % — HIGH (ref 94–98)
SAO2 % BLDA: 98.6 % — HIGH (ref 94–98)
SODIUM SERPL-SCNC: 140 MMOL/L — SIGNIFICANT CHANGE UP (ref 135–145)
SODIUM SERPL-SCNC: 140 MMOL/L — SIGNIFICANT CHANGE UP (ref 135–145)
SODIUM SERPL-SCNC: 141 MMOL/L — SIGNIFICANT CHANGE UP (ref 135–145)
SODIUM SERPL-SCNC: 141 MMOL/L — SIGNIFICANT CHANGE UP (ref 135–145)
SODIUM SERPL-SCNC: 142 MMOL/L — SIGNIFICANT CHANGE UP (ref 135–145)
SP GR SPEC: 1.03 — HIGH (ref 1–1.03)
SP GR SPEC: 1.03 — HIGH (ref 1–1.03)
UROBILINOGEN FLD QL: 0.2 MG/DL — SIGNIFICANT CHANGE UP (ref 0.2–1)
UROBILINOGEN FLD QL: 0.2 MG/DL — SIGNIFICANT CHANGE UP (ref 0.2–1)
WBC # BLD: 3.99 K/UL — SIGNIFICANT CHANGE UP (ref 3.8–10.5)
WBC # BLD: 3.99 K/UL — SIGNIFICANT CHANGE UP (ref 3.8–10.5)
WBC # BLD: 5.27 K/UL — SIGNIFICANT CHANGE UP (ref 3.8–10.5)
WBC # BLD: 5.27 K/UL — SIGNIFICANT CHANGE UP (ref 3.8–10.5)
WBC # FLD AUTO: 3.99 K/UL — SIGNIFICANT CHANGE UP (ref 3.8–10.5)
WBC # FLD AUTO: 3.99 K/UL — SIGNIFICANT CHANGE UP (ref 3.8–10.5)
WBC # FLD AUTO: 5.27 K/UL — SIGNIFICANT CHANGE UP (ref 3.8–10.5)
WBC # FLD AUTO: 5.27 K/UL — SIGNIFICANT CHANGE UP (ref 3.8–10.5)
WBC UR QL: 1 /HPF — SIGNIFICANT CHANGE UP (ref 0–5)
WBC UR QL: 1 /HPF — SIGNIFICANT CHANGE UP (ref 0–5)

## 2023-12-13 RX ORDER — INSULIN HUMAN 100 [IU]/ML
2.5 INJECTION, SOLUTION SUBCUTANEOUS
Qty: 100 | Refills: 0 | Status: DISCONTINUED | OUTPATIENT
Start: 2023-12-13 | End: 2023-12-13

## 2023-12-13 RX ORDER — POTASSIUM CHLORIDE 20 MEQ
40 PACKET (EA) ORAL EVERY 4 HOURS
Refills: 0 | Status: COMPLETED | OUTPATIENT
Start: 2023-12-13 | End: 2023-12-13

## 2023-12-13 RX ORDER — POTASSIUM PHOSPHATE, MONOBASIC POTASSIUM PHOSPHATE, DIBASIC 236; 224 MG/ML; MG/ML
30 INJECTION, SOLUTION INTRAVENOUS ONCE
Refills: 0 | Status: COMPLETED | OUTPATIENT
Start: 2023-12-13 | End: 2023-12-13

## 2023-12-13 RX ORDER — DEXTROSE 50 % IN WATER 50 %
15 SYRINGE (ML) INTRAVENOUS ONCE
Refills: 0 | Status: DISCONTINUED | OUTPATIENT
Start: 2023-12-13 | End: 2023-12-14

## 2023-12-13 RX ORDER — SODIUM CHLORIDE 9 MG/ML
1000 INJECTION, SOLUTION INTRAVENOUS
Refills: 0 | Status: DISCONTINUED | OUTPATIENT
Start: 2023-12-13 | End: 2023-12-14

## 2023-12-13 RX ORDER — GLUCAGON INJECTION, SOLUTION 0.5 MG/.1ML
1 INJECTION, SOLUTION SUBCUTANEOUS ONCE
Refills: 0 | Status: DISCONTINUED | OUTPATIENT
Start: 2023-12-13 | End: 2023-12-14

## 2023-12-13 RX ORDER — PANTOPRAZOLE SODIUM 20 MG/1
40 TABLET, DELAYED RELEASE ORAL
Refills: 0 | Status: DISCONTINUED | OUTPATIENT
Start: 2023-12-14 | End: 2023-12-14

## 2023-12-13 RX ORDER — INSULIN LISPRO 100/ML
VIAL (ML) SUBCUTANEOUS
Refills: 0 | Status: DISCONTINUED | OUTPATIENT
Start: 2023-12-13 | End: 2023-12-14

## 2023-12-13 RX ORDER — DEXTROSE 10 % IN WATER 10 %
1000 INTRAVENOUS SOLUTION INTRAVENOUS
Refills: 0 | Status: DISCONTINUED | OUTPATIENT
Start: 2023-12-13 | End: 2023-12-13

## 2023-12-13 RX ORDER — INSULIN HUMAN 100 [IU]/ML
2 INJECTION, SOLUTION SUBCUTANEOUS
Qty: 100 | Refills: 0 | Status: DISCONTINUED | OUTPATIENT
Start: 2023-12-13 | End: 2023-12-13

## 2023-12-13 RX ORDER — INSULIN LISPRO 100/ML
VIAL (ML) SUBCUTANEOUS AT BEDTIME
Refills: 0 | Status: DISCONTINUED | OUTPATIENT
Start: 2023-12-13 | End: 2023-12-14

## 2023-12-13 RX ADMIN — Medication 3 MILLIGRAM(S): at 21:24

## 2023-12-13 RX ADMIN — PANTOPRAZOLE SODIUM 40 MILLIGRAM(S): 20 TABLET, DELAYED RELEASE ORAL at 13:16

## 2023-12-13 RX ADMIN — SODIUM CHLORIDE 175 MILLILITER(S): 9 INJECTION, SOLUTION INTRAVENOUS at 06:04

## 2023-12-13 RX ADMIN — SODIUM CHLORIDE 100 MILLILITER(S): 9 INJECTION, SOLUTION INTRAVENOUS at 10:45

## 2023-12-13 RX ADMIN — POTASSIUM PHOSPHATE, MONOBASIC POTASSIUM PHOSPHATE, DIBASIC 83.33 MILLIMOLE(S): 236; 224 INJECTION, SOLUTION INTRAVENOUS at 06:32

## 2023-12-13 RX ADMIN — SODIUM CHLORIDE 100 MILLILITER(S): 9 INJECTION, SOLUTION INTRAVENOUS at 21:24

## 2023-12-13 RX ADMIN — Medication 40 MILLIEQUIVALENT(S): at 14:19

## 2023-12-13 RX ADMIN — CHLORHEXIDINE GLUCONATE 1 APPLICATION(S): 213 SOLUTION TOPICAL at 13:16

## 2023-12-13 RX ADMIN — ENOXAPARIN SODIUM 40 MILLIGRAM(S): 100 INJECTION SUBCUTANEOUS at 21:24

## 2023-12-13 RX ADMIN — Medication 40 MILLIEQUIVALENT(S): at 06:21

## 2023-12-13 RX ADMIN — Medication 75 MILLILITER(S): at 10:45

## 2023-12-13 NOTE — PROGRESS NOTE ADULT - SUBJECTIVE AND OBJECTIVE BOX
Date of entry of this note is equal to the date of services rendered   Patient is a 57y old  Male who presents with a chief complaint of euglycemic DKA (13 Dec 2023 17:46)    BRIEF HOSPITAL COURSE:   Mr. Cross is a 58 YO M w/pmhx of DM on Sitagliptin/Metformin & Dapagliflozin who presents to ED w/complaints of nausea and vomiting for the past two days. Patient was diagnosed w/influenza outpatient and start on Tamiflu. Patient found to have AGMA w/moderate acetone in serum    Events last 24 hours:   - Insulin infusion off  - Glucose 240 most recently   - Was not transitioned w/Lantus     Review of Systems:  Negative besides mentioned in HPI     PAST MEDICAL & SURGICAL HISTORY:  Diabetes Type II  Inguinal Hernia  S/P Arthroscopy  Elbow fracture repair    Medications:  acetaminophen     Tablet .. 650 milliGRAM(s) Oral every 6 hours PRN  melatonin 3 milliGRAM(s) Oral at bedtime  ondansetron Injectable 4 milliGRAM(s) IV Push every 6 hours PRN  enoxaparin Injectable 40 milliGRAM(s) SubCutaneous every 24 hours  dextrose 50% Injectable 25 Gram(s) IV Push once  dextrose Oral Gel 15 Gram(s) Oral once PRN  glucagon  Injectable 1 milliGRAM(s) IntraMuscular once  insulin lispro (ADMELOG) corrective regimen sliding scale   SubCutaneous at bedtime  insulin lispro (ADMELOG) corrective regimen sliding scale   SubCutaneous three times a day before meals  dextrose 5%. 1000 milliLiter(s) IV Continuous <Continuous>  dextrose 5%. 1000 milliLiter(s) IV Continuous <Continuous>  lactated ringers. 1000 milliLiter(s) IV Continuous <Continuous>  chlorhexidine 2% Cloths 1 Application(s) Topical <User Schedule>    ICU Vital Signs Last 24 Hrs  T(C): 37.2 (13 Dec 2023 18:00), Max: 37.2 (13 Dec 2023 18:00)  T(F): 98.9 (13 Dec 2023 18:00), Max: 98.9 (13 Dec 2023 18:00)  HR: 90 (13 Dec 2023 18:00) (68 - 101)  BP: 123/81 (13 Dec 2023 18:00) (102/71 - 125/71)  BP(mean): 94 (13 Dec 2023 18:00) (75 - 94)  RR: 26 (13 Dec 2023 18:00) (13 - 31)  SpO2: 100% (13 Dec 2023 18:00) (98% - 100%)  ABG - ( 13 Dec 2023 05:00 )  pH, Arterial: 7.35  pH, Blood: x     /  pCO2: 32    /  pO2: 98    / HCO3: 18    / Base Excess: -7.9  /  SaO2: 98.6      I&O's Detail    12 Dec 2023 07:01  -  13 Dec 2023 07:00  --------------------------------------------------------  IN:    dextrose 5% + lactated ringers: 500 mL    dextrose 5% + lactated ringers: 1400 mL    Insulin: 5 mL    Insulin: 4.5 mL    Insulin: 12.5 mL    IV PiggyBack: 83.3 mL  Total IN: 2005.3 mL    OUT:    Insulin: 0 mL    Voided (mL): 1200 mL  Total OUT: 1200 mL    Total NET: 805.3 mL    13 Dec 2023 07:01  -  13 Dec 2023 20:07  --------------------------------------------------------  IN:    dextrose 10%: 300 mL    dextrose 5% + lactated ringers: 525 mL    Insulin: 2.5 mL    Insulin: 6 mL    Insulin: 3 mL    IV PiggyBack: 416.5 mL    Lactated Ringers: 800 mL  Total IN: 2053 mL    OUT:  Total OUT: 0 mL    Total NET: 2053 mL    LABS:                        12.9   5.27  )-----------( 162      ( 13 Dec 2023 05:24 )             37.9     12-13    142  |  109<H>  |  9   ----------------------------<  174<H>  3.8   |  24  |  0.77    Ca    8.5      13 Dec 2023 13:50  Phos  2.3     12-13  Mg     2.1     12-13    TPro  6.9  /  Alb  3.3  /  TBili  0.3  /  DBili  x   /  AST  12  /  ALT  18  /  AlkPhos  44  12-12    POCT Blood Glucose.: 240 mg/dL (13 Dec 2023 20:02)    Urinalysis Basic - ( 13 Dec 2023 13:50 )  Color: x / Appearance: x / SG: x / pH: x  Gluc: 174 mg/dL / Ketone: x  / Bili: x / Urobili: x   Blood: x / Protein: x / Nitrite: x   Leuk Esterase: x / RBC: x / WBC x   Sq Epi: x / Non Sq Epi: x / Bacteria: x    CULTURES:  Rapid RVP Result: Detected (12-12 @ 17:17)    Physical Examination:  General: No acute distress.    HEENT: Pupils equal, reactive to light.  Symmetric.  PULM: Clear to auscultation bilaterally, no significant sputum production  NECK: Supple, no lymphadenopathy, trachea midline  CVS: Regular rate and rhythm, no murmurs, rubs, or gallops  ABD: Soft, nondistended, nontender, normoactive bowel sounds, no masses  EXT: No edema, nontender  SKIN: Warm and well perfused  NEURO: Alert, oriented, interactive, nonfocal Date of entry of this note is equal to the date of services rendered   Patient is a 57y old  Male who presents with a chief complaint of euglycemic DKA (13 Dec 2023 17:46)    BRIEF HOSPITAL COURSE:   Mr. Cross is a 56 YO M w/pmhx of DM on Sitagliptin/Metformin & Dapagliflozin who presents to ED w/complaints of nausea and vomiting for the past two days. Patient was diagnosed w/influenza outpatient and start on Tamiflu. Patient found to have AGMA w/moderate acetone in serum    Events last 24 hours:   - Insulin infusion off  - Glucose 240 most recently   - Was not transitioned w/Lantus     Review of Systems:  Negative besides mentioned in HPI     PAST MEDICAL & SURGICAL HISTORY:  Diabetes Type II  Inguinal Hernia  S/P Arthroscopy  Elbow fracture repair    Medications:  acetaminophen     Tablet .. 650 milliGRAM(s) Oral every 6 hours PRN  melatonin 3 milliGRAM(s) Oral at bedtime  ondansetron Injectable 4 milliGRAM(s) IV Push every 6 hours PRN  enoxaparin Injectable 40 milliGRAM(s) SubCutaneous every 24 hours  dextrose 50% Injectable 25 Gram(s) IV Push once  dextrose Oral Gel 15 Gram(s) Oral once PRN  glucagon  Injectable 1 milliGRAM(s) IntraMuscular once  insulin lispro (ADMELOG) corrective regimen sliding scale   SubCutaneous at bedtime  insulin lispro (ADMELOG) corrective regimen sliding scale   SubCutaneous three times a day before meals  dextrose 5%. 1000 milliLiter(s) IV Continuous <Continuous>  dextrose 5%. 1000 milliLiter(s) IV Continuous <Continuous>  lactated ringers. 1000 milliLiter(s) IV Continuous <Continuous>  chlorhexidine 2% Cloths 1 Application(s) Topical <User Schedule>    ICU Vital Signs Last 24 Hrs  T(C): 37.2 (13 Dec 2023 18:00), Max: 37.2 (13 Dec 2023 18:00)  T(F): 98.9 (13 Dec 2023 18:00), Max: 98.9 (13 Dec 2023 18:00)  HR: 90 (13 Dec 2023 18:00) (68 - 101)  BP: 123/81 (13 Dec 2023 18:00) (102/71 - 125/71)  BP(mean): 94 (13 Dec 2023 18:00) (75 - 94)  RR: 26 (13 Dec 2023 18:00) (13 - 31)  SpO2: 100% (13 Dec 2023 18:00) (98% - 100%)  ABG - ( 13 Dec 2023 05:00 )  pH, Arterial: 7.35  pH, Blood: x     /  pCO2: 32    /  pO2: 98    / HCO3: 18    / Base Excess: -7.9  /  SaO2: 98.6      I&O's Detail    12 Dec 2023 07:01  -  13 Dec 2023 07:00  --------------------------------------------------------  IN:    dextrose 5% + lactated ringers: 500 mL    dextrose 5% + lactated ringers: 1400 mL    Insulin: 5 mL    Insulin: 4.5 mL    Insulin: 12.5 mL    IV PiggyBack: 83.3 mL  Total IN: 2005.3 mL    OUT:    Insulin: 0 mL    Voided (mL): 1200 mL  Total OUT: 1200 mL    Total NET: 805.3 mL    13 Dec 2023 07:01  -  13 Dec 2023 20:07  --------------------------------------------------------  IN:    dextrose 10%: 300 mL    dextrose 5% + lactated ringers: 525 mL    Insulin: 2.5 mL    Insulin: 6 mL    Insulin: 3 mL    IV PiggyBack: 416.5 mL    Lactated Ringers: 800 mL  Total IN: 2053 mL    OUT:  Total OUT: 0 mL    Total NET: 2053 mL    LABS:                        12.9   5.27  )-----------( 162      ( 13 Dec 2023 05:24 )             37.9     12-13    142  |  109<H>  |  9   ----------------------------<  174<H>  3.8   |  24  |  0.77    Ca    8.5      13 Dec 2023 13:50  Phos  2.3     12-13  Mg     2.1     12-13    TPro  6.9  /  Alb  3.3  /  TBili  0.3  /  DBili  x   /  AST  12  /  ALT  18  /  AlkPhos  44  12-12    POCT Blood Glucose.: 240 mg/dL (13 Dec 2023 20:02)    Urinalysis Basic - ( 13 Dec 2023 13:50 )  Color: x / Appearance: x / SG: x / pH: x  Gluc: 174 mg/dL / Ketone: x  / Bili: x / Urobili: x   Blood: x / Protein: x / Nitrite: x   Leuk Esterase: x / RBC: x / WBC x   Sq Epi: x / Non Sq Epi: x / Bacteria: x    CULTURES:  Rapid RVP Result: Detected (12-12 @ 17:17)    Physical Examination:  General: No acute distress.    HEENT: Pupils equal, reactive to light.  Symmetric.  PULM: Clear to auscultation bilaterally, no significant sputum production  NECK: Supple, no lymphadenopathy, trachea midline  CVS: Regular rate and rhythm, no murmurs, rubs, or gallops  ABD: Soft, nondistended, nontender, normoactive bowel sounds, no masses  EXT: No edema, nontender  SKIN: Warm and well perfused  NEURO: Alert, oriented, interactive, nonfocal

## 2023-12-13 NOTE — PROGRESS NOTE ADULT - ASSESSMENT
Assessment:  Mr. Cross is a 58 YO M w/pmhx of DM on Sitagliptin/Metformin & Dapagliflozin who presents to ED w/complaints of nausea and vomiting for the past two days. Patient was diagnosed w/influenza outpatient and start on Tamiflu. Patient found to have AGMA w/moderate acetone in serum    Problem List:  1. DKA --> type II. Euglycemic in setting of SGLT2-i usage     Plan:  Neuro: Monitor mental status, avoid deliriogenic medications. Pain & fever control as needed    CV: No cardiac disease. Monitor HR and BP     Pulm: Influenza +, had cough, now improving     Renal: Renal function worse than baseline, should improve w/IVF. Strict I/Os, goal UOP >0.5cc/kg/hr. Trend renal function and electrolytes, replete as needed. Avoid nephrotoxic agents    GI: NPO. PPI     ID: Influenza+. Tamiflu. Trend WBC/fevers/procalcitonin     Heme: Lovenox for DVT prophylaxis     Endo: Insulin infusion off. Sliding scale. May require long acting insulin     Time spent on this patient encounter, which includes documenting this note in the electronic medical record, was 35 minutes including assessing the presenting problems with associated risks, reviewing the medical record to prepare for the encounter, and meeting face to face with the patient to obtain additional history.  I have also performed an appropriate physical exam, made interventions listed and ordered and interpreted appropriate diagnostic studies as documented.     To improve communication and patient safety, I have coordinated care with the multidisciplinary team including the bedside nurse, appropriate attending of record and consultants as needed.    Date of entry of this note is equal to the date of services rendered

## 2023-12-13 NOTE — DIETITIAN INITIAL EVALUATION ADULT - OTHER INFO
57 /yo M with PMH DM presented to ED for c/o nausea vomiting x 2 days. Pt was found ot be FLU + 2 days ago and started on Tamiflu which he then developed nausea and vomiting and therefore has been unable to take his PO meds. Patient found to be in euglycemia DKA. Patient on D5% LR @ 175ml/hr , insulin infusion noted , POCT reviewed , last A1c (10/23) 7.6% , NKFA noted , no edema, Skin intact , manages DM with Metformin & Farxiga , patient with c/o hunger , anion gap closed , diet advanced to clear fluids ,

## 2023-12-13 NOTE — DIETITIAN INITIAL EVALUATION ADULT - PERTINENT MEDS FT
MEDICATIONS  (STANDING):  chlorhexidine 2% Cloths 1 Application(s) Topical <User Schedule>  dextrose 5% + lactated ringers. 1000 milliLiter(s) (175 mL/Hr) IV Continuous <Continuous>  dextrose 50% Injectable 25 Gram(s) IV Push once  enoxaparin Injectable 40 milliGRAM(s) SubCutaneous every 24 hours  insulin regular Infusion 1 Unit(s)/Hr (1 mL/Hr) IV Continuous <Continuous>  melatonin 3 milliGRAM(s) Oral at bedtime  oseltamivir 75 milliGRAM(s) Oral two times a day  pantoprazole  Injectable 40 milliGRAM(s) IV Push daily  potassium chloride    Tablet ER 40 milliEquivalent(s) Oral every 4 hours    MEDICATIONS  (PRN):  acetaminophen     Tablet .. 650 milliGRAM(s) Oral every 6 hours PRN Temp greater or equal to 38C (100.4F)  ondansetron Injectable 4 milliGRAM(s) IV Push every 6 hours PRN Nausea and/or Vomiting

## 2023-12-13 NOTE — PROGRESS NOTE ADULT - SUBJECTIVE AND OBJECTIVE BOX
INTERVAL HPI/OVERNIGHT EVENTS:   awake and alert , stated " i am feeling better"    Antimicrobial:    Cardiovascular:    Pulmonary:    Hematalogic:  enoxaparin Injectable 40 milliGRAM(s) SubCutaneous every 24 hours    Other:  acetaminophen     Tablet .. 650 milliGRAM(s) Oral every 6 hours PRN  chlorhexidine 2% Cloths 1 Application(s) Topical <User Schedule>  dextrose 5%. 1000 milliLiter(s) IV Continuous <Continuous>  dextrose 5%. 1000 milliLiter(s) IV Continuous <Continuous>  dextrose 50% Injectable 25 Gram(s) IV Push once  dextrose Oral Gel 15 Gram(s) Oral once PRN  glucagon  Injectable 1 milliGRAM(s) IntraMuscular once  insulin lispro (ADMELOG) corrective regimen sliding scale   SubCutaneous three times a day before meals  insulin lispro (ADMELOG) corrective regimen sliding scale   SubCutaneous at bedtime  lactated ringers. 1000 milliLiter(s) IV Continuous <Continuous>  melatonin 3 milliGRAM(s) Oral at bedtime  ondansetron Injectable 4 milliGRAM(s) IV Push every 6 hours PRN      Drug Dosing Weight  Height (cm): 182.9 (12 Dec 2023 17:30)  Weight (kg): 70.5 (12 Dec 2023 17:30)  BMI (kg/m2): 21.1 (12 Dec 2023 17:30)  BSA (m2): 1.91 (12 Dec 2023 17:30)    CENTRAL LINE: [ ] YES [ ] NO  LOCATION:   DATE INSERTED:    KENNEDY: [ ] YES [ ] NO    DATE INSERTED:    A-LINE:  [ ] YES [ ] NO  LOCATION:   DATE INSERTED:    PMH/Social Hx/Fam Hx -reviewed admission note, no change since admission  PAST MEDICAL & SURGICAL HISTORY:  Diabetes Type II      Inguinal Hernia      S/P Arthroscopy      Elbow fracture repair          T(C): 36.6 (12-13-23 @ 14:00), Max: 36.7 (12-13-23 @ 01:00)  HR: 73 (12-13-23 @ 17:00)  BP: 116/66 (12-13-23 @ 17:00)  BP(mean): 81 (12-13-23 @ 17:00)  ABP: --  ABP(mean): --  RR: 13 (12-13-23 @ 17:00)  SpO2: 99% (12-13-23 @ 17:00)  Wt(kg): --    ABG - ( 13 Dec 2023 05:00 )  pH, Arterial: 7.35  pH, Blood: x     /  pCO2: 32    /  pO2: 98    / HCO3: 18    / Base Excess: -7.9  /  SaO2: 98.6                  12-12 @ 07:01  -  12-13 @ 07:00  --------------------------------------------------------  IN: 2005.3 mL / OUT: 1200 mL / NET: 805.3 mL            PHYSICAL EXAM:    GENERAL: No signs of distress, comfortable  HEAD: Atraumatic, Normocephalic  EYES: EOMI, PERRLA  ENMT: No erythema, exudates, or enlargement, Moist mucous membranes  NECK: Supple, normal appearance, No JVD; [  ] central line (if applicable)  CHEST/LUNG: No chest deformity, fair bilateral air entry; No rales, rhonchi, wheezing; crackles  HEART: Regular rate and rhythm; No murmurs, rubs, or gallops;   ABDOMEN: Soft, Nontender, Nondistended; Bowel sounds present  EXTREMITIES:  + Peripheral Pulses, No clubbing, cyanosis, or edema  NERVOUS SYSTEM: awake and alert x 3, follows commands, upper and lower extremities  LYMPH: No lymphadenopathy noted  SKIN: No rashes or lesions; good turgor, warm, dry      LABS:  CBC Full  -  ( 13 Dec 2023 05:24 )  WBC Count : 5.27 K/uL  RBC Count : 4.01 M/uL  Hemoglobin : 12.9 g/dL  Hematocrit : 37.9 %  Platelet Count - Automated : 162 K/uL  Mean Cell Volume : 94.5 fl  Mean Cell Hemoglobin : 32.2 pg  Mean Cell Hemoglobin Concentration : 34.0 gm/dL  Auto Neutrophil # : x  Auto Lymphocyte # : x  Auto Monocyte # : x  Auto Eosinophil # : x  Auto Basophil # : x  Auto Neutrophil % : x  Auto Lymphocyte % : x  Auto Monocyte % : x  Auto Eosinophil % : x  Auto Basophil % : x    12-13    142  |  109<H>  |  9   ----------------------------<  174<H>  3.8   |  24  |  0.77    Ca    8.5      13 Dec 2023 13:50  Phos  2.3     12-13  Mg     2.1     12-13    TPro  6.9  /  Alb  3.3  /  TBili  0.3  /  DBili  x   /  AST  12  /  ALT  18  /  AlkPhos  44  12-12      Urinalysis Basic - ( 13 Dec 2023 13:50 )    Color: x / Appearance: x / SG: x / pH: x  Gluc: 174 mg/dL / Ketone: x  / Bili: x / Urobili: x   Blood: x / Protein: x / Nitrite: x   Leuk Esterase: x / RBC: x / WBC x   Sq Epi: x / Non Sq Epi: x / Bacteria: x          RADIOLOGY & ADDITIONAL STUDIES REVIEWED         IMPRESSION:  PAST MEDICAL & SURGICAL HISTORY:  Diabetes Type II      Inguinal Hernia      S/P Arthroscopy      Elbow fracture repair      IMP: This is a 57 yr odl man  with DM on janumet and farxiga presented to [facility] for nausea vomiting x 2 days. FLu + and started on Tamiflu on Sunday and unable to po since and therefore missed his PO meds. Patient admitted to ICU for further management of: S/P 3+ L of IVF        ASSESSMENT   - Euglycemic DKA  - HAGMA  - Influenza A  - Hypovolemia in setting of nausea and vomiting          PLAN:    - Continue isolation for influenza  - No Tamiflu   - HOLD ORAL DIABETIC MEDS  - Off insulin gtt and GAP closed  - Continue FS q1h x 4 hours   - Adjust insulin   - Diabetic teaching   - Endo eval   - IVF d/c'ed  - Started on diet   - Resp status stable, no needs for oxygen supplementation at this time  - Monitro Spo2 and minatain > 92%  - DVT GI prophy   - OOB to chair    INTERVAL HPI/OVERNIGHT EVENTS:   awake and alert , stated " i am feeling better"    Antimicrobial:    Cardiovascular:    Pulmonary:    Hematalogic:  enoxaparin Injectable 40 milliGRAM(s) SubCutaneous every 24 hours    Other:  acetaminophen     Tablet .. 650 milliGRAM(s) Oral every 6 hours PRN  chlorhexidine 2% Cloths 1 Application(s) Topical <User Schedule>  dextrose 5%. 1000 milliLiter(s) IV Continuous <Continuous>  dextrose 5%. 1000 milliLiter(s) IV Continuous <Continuous>  dextrose 50% Injectable 25 Gram(s) IV Push once  dextrose Oral Gel 15 Gram(s) Oral once PRN  glucagon  Injectable 1 milliGRAM(s) IntraMuscular once  insulin lispro (ADMELOG) corrective regimen sliding scale   SubCutaneous three times a day before meals  insulin lispro (ADMELOG) corrective regimen sliding scale   SubCutaneous at bedtime  lactated ringers. 1000 milliLiter(s) IV Continuous <Continuous>  melatonin 3 milliGRAM(s) Oral at bedtime  ondansetron Injectable 4 milliGRAM(s) IV Push every 6 hours PRN      Drug Dosing Weight  Height (cm): 182.9 (12 Dec 2023 17:30)  Weight (kg): 70.5 (12 Dec 2023 17:30)  BMI (kg/m2): 21.1 (12 Dec 2023 17:30)  BSA (m2): 1.91 (12 Dec 2023 17:30)    CENTRAL LINE: [ ] YES [ ] NO  LOCATION:   DATE INSERTED:    KENNEDY: [ ] YES [ ] NO    DATE INSERTED:    A-LINE:  [ ] YES [ ] NO  LOCATION:   DATE INSERTED:    PMH/Social Hx/Fam Hx -reviewed admission note, no change since admission  PAST MEDICAL & SURGICAL HISTORY:  Diabetes Type II      Inguinal Hernia      S/P Arthroscopy      Elbow fracture repair          T(C): 36.6 (12-13-23 @ 14:00), Max: 36.7 (12-13-23 @ 01:00)  HR: 73 (12-13-23 @ 17:00)  BP: 116/66 (12-13-23 @ 17:00)  BP(mean): 81 (12-13-23 @ 17:00)  ABP: --  ABP(mean): --  RR: 13 (12-13-23 @ 17:00)  SpO2: 99% (12-13-23 @ 17:00)  Wt(kg): --    ABG - ( 13 Dec 2023 05:00 )  pH, Arterial: 7.35  pH, Blood: x     /  pCO2: 32    /  pO2: 98    / HCO3: 18    / Base Excess: -7.9  /  SaO2: 98.6                  12-12 @ 07:01  -  12-13 @ 07:00  --------------------------------------------------------  IN: 2005.3 mL / OUT: 1200 mL / NET: 805.3 mL            PHYSICAL EXAM:    GENERAL: No signs of distress, comfortable  HEAD: Atraumatic, Normocephalic  EYES: EOMI, PERRLA  ENMT: No erythema, exudates, or enlargement, Moist mucous membranes  NECK: Supple, normal appearance, No JVD; [  ] central line (if applicable)  CHEST/LUNG: No chest deformity, fair bilateral air entry; No rales, rhonchi, wheezing; crackles  HEART: Regular rate and rhythm; No murmurs, rubs, or gallops;   ABDOMEN: Soft, Nontender, Nondistended; Bowel sounds present  EXTREMITIES:  + Peripheral Pulses, No clubbing, cyanosis, or edema  NERVOUS SYSTEM: awake and alert x 3, follows commands, upper and lower extremities  LYMPH: No lymphadenopathy noted  SKIN: No rashes or lesions; good turgor, warm, dry      LABS:  CBC Full  -  ( 13 Dec 2023 05:24 )  WBC Count : 5.27 K/uL  RBC Count : 4.01 M/uL  Hemoglobin : 12.9 g/dL  Hematocrit : 37.9 %  Platelet Count - Automated : 162 K/uL  Mean Cell Volume : 94.5 fl  Mean Cell Hemoglobin : 32.2 pg  Mean Cell Hemoglobin Concentration : 34.0 gm/dL  Auto Neutrophil # : x  Auto Lymphocyte # : x  Auto Monocyte # : x  Auto Eosinophil # : x  Auto Basophil # : x  Auto Neutrophil % : x  Auto Lymphocyte % : x  Auto Monocyte % : x  Auto Eosinophil % : x  Auto Basophil % : x    12-13    142  |  109<H>  |  9   ----------------------------<  174<H>  3.8   |  24  |  0.77    Ca    8.5      13 Dec 2023 13:50  Phos  2.3     12-13  Mg     2.1     12-13    TPro  6.9  /  Alb  3.3  /  TBili  0.3  /  DBili  x   /  AST  12  /  ALT  18  /  AlkPhos  44  12-12      Urinalysis Basic - ( 13 Dec 2023 13:50 )    Color: x / Appearance: x / SG: x / pH: x  Gluc: 174 mg/dL / Ketone: x  / Bili: x / Urobili: x   Blood: x / Protein: x / Nitrite: x   Leuk Esterase: x / RBC: x / WBC x   Sq Epi: x / Non Sq Epi: x / Bacteria: x          RADIOLOGY & ADDITIONAL STUDIES REVIEWED         IMPRESSION:  PAST MEDICAL & SURGICAL HISTORY:  Diabetes Type II      Inguinal Hernia      S/P Arthroscopy      Elbow fracture repair      IMP: This is a 57 yr odl man  with DM on janumet and farxiga presented to [facility] for nausea vomiting x 2 days. FLu + and started on Tamiflu on Sunday and unable to po since and therefore missed his PO meds. Patient admitted to ICU for further management of: S/P 3+ L of IVF        ASSESSMENT   - Euglycemic DKA  - HAGMA  - Influenza A  - Hypovolemia in setting of nausea and vomiting          PLAN:    - Continue isolation for influenza  - No Tamiflu   - HOLD ORAL DIABETIC MEDS  - Off insulin gtt and GAP closed  - Continue FS q1h x 4 hours   - Adjust insulin   - Diabetic teaching   - Endo eval   - IVF d/c'ed  - Started on diet   - Resp status stable, no needs for oxygen supplementation at this time  - Monitro Spo2 and minatain > 92%  - DVT GI prophy   - OOB to chair

## 2023-12-13 NOTE — DIETITIAN INITIAL EVALUATION ADULT - RD TO REMAIN AVAILABLE
Demographics: no change    Problem List: updated    Genetics: PRSS1    Labs:  3/1/2023    Meds: reviewed    Nutrition:   Appetite good  Continue low oxalate diet lifelong to prevent kidney stones  Encourage regular meals and snacks.    Continue to work on healthy diet variety.  Prealbumin: 8.2 (n 12-42)    EPI on PERT-:  Creon dose:  using the 6000 s 2-3 w/ meals. Dr. Ordaz ordered the 12 s but they have not started. Occasional greasy oily stools. Suggested increasing the enzymes. Until the 6 s are gone to use 4-6 w/ meals and 2-3 w/ snacks. Then use the 12 s 2-3 w/ meals and 1-2 w/ snacks.     (Addendum: My Chart from mom: I was mistaken and Yes we have been on the 12,000 creons.  We have been doing the 2-3 with food. and 1-2 for snacks. With pizza or greasy foods- we do 4 at times.)    Creon/MVW resources: sent   Vitamins: continue  Iron: not on    Bone Density: 01/26/2022 Bone mineral density of the lumbar spine within normal range for age. Repeat January 2024-    GI:  Stool frequency: daily  Constipation: no  Stool Softeners: lactulose 5 mLs daily  Greasy or oily stools: yes, occasional  Gastroparesis: no  GERD: no  PPI therapy: Pantoprazole 20 mgs daily  Nausea: no  Zofran: no    Anthropomorphics: Ht 125.9 cm  Wt 22.6 kg  BMI 14.26 kg/m    14.26 kg/m  (12 %, Z= -1.20, Source: [facility] (Boys, 2-20 Years)Bone Density:      Stent: no    Asplenic status/infection:  Antibiotics: no  Seek evaluation for any fevers.  Immunizations:  Men ACWY: Due now and then every 5 years  Men B: starting at age 10. Two doses one month apart and then every 2-3 years.   PPSV23: Due 9/21/2023  Flu: every fall  COVID: Primary series and all recommended boosters.     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    Pain: no    Mental Health: Attitude issues possibly related to blood sugar   Sleep: good   School:  perfect. Doing well in school.    Social: Lots of friends   Sports: Basketball, football, baseball.                     yes

## 2023-12-13 NOTE — DIETITIAN INITIAL EVALUATION ADULT - PERTINENT LABORATORY DATA
12-13    142  |  107  |  15  ----------------------------<  147<H>  3.4<L>   |  19<L>  |  0.91    Ca    8.7      13 Dec 2023 05:24  Phos  1.2     12-13  Mg     1.9     12-13    TPro  6.9  /  Alb  3.3  /  TBili  0.3  /  DBili  x   /  AST  12  /  ALT  18  /  AlkPhos  44  12-12  POCT Blood Glucose.: 152 mg/dL (12-13-23 @ 09:03)  A1C with Estimated Average Glucose Result: 7.8 % (10-19-23 @ 10:22)

## 2023-12-14 ENCOUNTER — TRANSCRIPTION ENCOUNTER (OUTPATIENT)
Age: 58
End: 2023-12-14

## 2023-12-14 VITALS
HEART RATE: 82 BPM | SYSTOLIC BLOOD PRESSURE: 130 MMHG | TEMPERATURE: 98 F | DIASTOLIC BLOOD PRESSURE: 72 MMHG | OXYGEN SATURATION: 98 % | RESPIRATION RATE: 16 BRPM

## 2023-12-14 LAB
ANION GAP SERPL CALC-SCNC: 9 MMOL/L — SIGNIFICANT CHANGE UP (ref 5–17)
ANION GAP SERPL CALC-SCNC: 9 MMOL/L — SIGNIFICANT CHANGE UP (ref 5–17)
BUN SERPL-MCNC: 6 MG/DL — LOW (ref 7–23)
BUN SERPL-MCNC: 6 MG/DL — LOW (ref 7–23)
CALCIUM SERPL-MCNC: 8.5 MG/DL — SIGNIFICANT CHANGE UP (ref 8.4–10.5)
CALCIUM SERPL-MCNC: 8.5 MG/DL — SIGNIFICANT CHANGE UP (ref 8.4–10.5)
CHLORIDE SERPL-SCNC: 105 MMOL/L — SIGNIFICANT CHANGE UP (ref 96–108)
CHLORIDE SERPL-SCNC: 105 MMOL/L — SIGNIFICANT CHANGE UP (ref 96–108)
CO2 SERPL-SCNC: 26 MMOL/L — SIGNIFICANT CHANGE UP (ref 22–31)
CO2 SERPL-SCNC: 26 MMOL/L — SIGNIFICANT CHANGE UP (ref 22–31)
CREAT SERPL-MCNC: 0.62 MG/DL — SIGNIFICANT CHANGE UP (ref 0.5–1.3)
CREAT SERPL-MCNC: 0.62 MG/DL — SIGNIFICANT CHANGE UP (ref 0.5–1.3)
EGFR: 113 ML/MIN/1.73M2 — SIGNIFICANT CHANGE UP
EGFR: 113 ML/MIN/1.73M2 — SIGNIFICANT CHANGE UP
GLUCOSE BLDC GLUCOMTR-MCNC: 152 MG/DL — HIGH (ref 70–99)
GLUCOSE BLDC GLUCOMTR-MCNC: 152 MG/DL — HIGH (ref 70–99)
GLUCOSE BLDC GLUCOMTR-MCNC: 232 MG/DL — HIGH (ref 70–99)
GLUCOSE BLDC GLUCOMTR-MCNC: 232 MG/DL — HIGH (ref 70–99)
GLUCOSE SERPL-MCNC: 176 MG/DL — HIGH (ref 70–99)
GLUCOSE SERPL-MCNC: 176 MG/DL — HIGH (ref 70–99)
HCT VFR BLD CALC: 35.9 % — LOW (ref 39–50)
HCT VFR BLD CALC: 35.9 % — LOW (ref 39–50)
HGB BLD-MCNC: 12.1 G/DL — LOW (ref 13–17)
HGB BLD-MCNC: 12.1 G/DL — LOW (ref 13–17)
MAGNESIUM SERPL-MCNC: 2 MG/DL — SIGNIFICANT CHANGE UP (ref 1.6–2.6)
MAGNESIUM SERPL-MCNC: 2 MG/DL — SIGNIFICANT CHANGE UP (ref 1.6–2.6)
MCHC RBC-ENTMCNC: 31.8 PG — SIGNIFICANT CHANGE UP (ref 27–34)
MCHC RBC-ENTMCNC: 31.8 PG — SIGNIFICANT CHANGE UP (ref 27–34)
MCHC RBC-ENTMCNC: 33.7 GM/DL — SIGNIFICANT CHANGE UP (ref 32–36)
MCHC RBC-ENTMCNC: 33.7 GM/DL — SIGNIFICANT CHANGE UP (ref 32–36)
MCV RBC AUTO: 94.2 FL — SIGNIFICANT CHANGE UP (ref 80–100)
MCV RBC AUTO: 94.2 FL — SIGNIFICANT CHANGE UP (ref 80–100)
NRBC # BLD: 0 /100 WBCS — SIGNIFICANT CHANGE UP (ref 0–0)
NRBC # BLD: 0 /100 WBCS — SIGNIFICANT CHANGE UP (ref 0–0)
PHOSPHATE SERPL-MCNC: 1.6 MG/DL — LOW (ref 2.5–4.5)
PHOSPHATE SERPL-MCNC: 1.6 MG/DL — LOW (ref 2.5–4.5)
PLATELET # BLD AUTO: 132 K/UL — LOW (ref 150–400)
PLATELET # BLD AUTO: 132 K/UL — LOW (ref 150–400)
POTASSIUM SERPL-MCNC: 3.8 MMOL/L — SIGNIFICANT CHANGE UP (ref 3.5–5.3)
POTASSIUM SERPL-MCNC: 3.8 MMOL/L — SIGNIFICANT CHANGE UP (ref 3.5–5.3)
POTASSIUM SERPL-SCNC: 3.8 MMOL/L — SIGNIFICANT CHANGE UP (ref 3.5–5.3)
POTASSIUM SERPL-SCNC: 3.8 MMOL/L — SIGNIFICANT CHANGE UP (ref 3.5–5.3)
RBC # BLD: 3.81 M/UL — LOW (ref 4.2–5.8)
RBC # BLD: 3.81 M/UL — LOW (ref 4.2–5.8)
RBC # FLD: 13.6 % — SIGNIFICANT CHANGE UP (ref 10.3–14.5)
RBC # FLD: 13.6 % — SIGNIFICANT CHANGE UP (ref 10.3–14.5)
SODIUM SERPL-SCNC: 140 MMOL/L — SIGNIFICANT CHANGE UP (ref 135–145)
SODIUM SERPL-SCNC: 140 MMOL/L — SIGNIFICANT CHANGE UP (ref 135–145)
WBC # BLD: 3.62 K/UL — LOW (ref 3.8–10.5)
WBC # BLD: 3.62 K/UL — LOW (ref 3.8–10.5)
WBC # FLD AUTO: 3.62 K/UL — LOW (ref 3.8–10.5)
WBC # FLD AUTO: 3.62 K/UL — LOW (ref 3.8–10.5)

## 2023-12-14 PROCEDURE — 82962 GLUCOSE BLOOD TEST: CPT

## 2023-12-14 PROCEDURE — 84100 ASSAY OF PHOSPHORUS: CPT

## 2023-12-14 PROCEDURE — 83690 ASSAY OF LIPASE: CPT

## 2023-12-14 PROCEDURE — 83036 HEMOGLOBIN GLYCOSYLATED A1C: CPT

## 2023-12-14 PROCEDURE — 99285 EMERGENCY DEPT VISIT HI MDM: CPT | Mod: 25

## 2023-12-14 PROCEDURE — 85027 COMPLETE CBC AUTOMATED: CPT

## 2023-12-14 PROCEDURE — 81001 URINALYSIS AUTO W/SCOPE: CPT

## 2023-12-14 PROCEDURE — 0225U NFCT DS DNA&RNA 21 SARSCOV2: CPT

## 2023-12-14 PROCEDURE — 36415 COLL VENOUS BLD VENIPUNCTURE: CPT

## 2023-12-14 PROCEDURE — 80053 COMPREHEN METABOLIC PANEL: CPT

## 2023-12-14 PROCEDURE — 96375 TX/PRO/DX INJ NEW DRUG ADDON: CPT

## 2023-12-14 PROCEDURE — 36600 WITHDRAWAL OF ARTERIAL BLOOD: CPT

## 2023-12-14 PROCEDURE — 96361 HYDRATE IV INFUSION ADD-ON: CPT

## 2023-12-14 PROCEDURE — 80307 DRUG TEST PRSMV CHEM ANLYZR: CPT

## 2023-12-14 PROCEDURE — 85025 COMPLETE CBC W/AUTO DIFF WBC: CPT

## 2023-12-14 PROCEDURE — 83735 ASSAY OF MAGNESIUM: CPT

## 2023-12-14 PROCEDURE — 82803 BLOOD GASES ANY COMBINATION: CPT

## 2023-12-14 PROCEDURE — 82009 KETONE BODYS QUAL: CPT

## 2023-12-14 PROCEDURE — 80048 BASIC METABOLIC PNL TOTAL CA: CPT

## 2023-12-14 PROCEDURE — 96365 THER/PROPH/DIAG IV INF INIT: CPT

## 2023-12-14 PROCEDURE — 82010 KETONE BODYS QUAN: CPT

## 2023-12-14 RX ORDER — ONDANSETRON 8 MG/1
1 TABLET, FILM COATED ORAL
Qty: 3 | Refills: 0
Start: 2023-12-14 | End: 2023-12-23

## 2023-12-14 RX ORDER — FAMOTIDINE 10 MG/ML
1 INJECTION INTRAVENOUS
Qty: 0 | Refills: 0 | DISCHARGE
Start: 2023-12-14

## 2023-12-14 RX ORDER — DAPAGLIFLOZIN 10 MG/1
1 TABLET, FILM COATED ORAL
Refills: 0 | DISCHARGE

## 2023-12-14 RX ORDER — FAMOTIDINE 10 MG/ML
20 INJECTION INTRAVENOUS
Refills: 0 | Status: DISCONTINUED | OUTPATIENT
Start: 2023-12-14 | End: 2023-12-14

## 2023-12-14 RX ORDER — ONDANSETRON 8 MG/1
4 TABLET, FILM COATED ORAL THREE TIMES A DAY
Refills: 0 | Status: DISCONTINUED | OUTPATIENT
Start: 2023-12-14 | End: 2023-12-14

## 2023-12-14 RX ADMIN — SODIUM CHLORIDE 100 MILLILITER(S): 9 INJECTION, SOLUTION INTRAVENOUS at 05:48

## 2023-12-14 RX ADMIN — Medication 2: at 07:15

## 2023-12-14 RX ADMIN — Medication 100 MILLIGRAM(S): at 02:39

## 2023-12-14 RX ADMIN — CHLORHEXIDINE GLUCONATE 1 APPLICATION(S): 213 SOLUTION TOPICAL at 05:48

## 2023-12-14 RX ADMIN — Medication 4: at 11:04

## 2023-12-14 NOTE — DISCHARGE NOTE NURSING/CASE MANAGEMENT/SOCIAL WORK - NSDCPEFALRISK_GEN_ALL_CORE
For information on Fall & Injury Prevention, visit: [email or web address].[email or web address]/news/fall-prevention-protects-and-maintains-health-and-mobility OR  https://www.[facility]/news/fall-prevention-tips-to-avoid-injury OR  https://www.cdc.gov/steadi/patient.html For information on Fall & Injury Prevention, visit: https://www.[facility]/news/fall-prevention-protects-and-maintains-health-and-mobility OR  https://www.[facility]/news/fall-prevention-tips-to-avoid-injury OR  https://www.cdc.gov/steadi/patient.html

## 2023-12-14 NOTE — PROGRESS NOTE ADULT - NS ATTEND AMEND GEN_ALL_CORE FT
pt seen and examined doing well  called luiza Fuchs [phone] d/w Dr Gongora   she advised to restart janumet and monitor fingersticks  f/u with office after discharge and if glu is elevated  patient doing well and stable for discharge. pt seen and examined doing well  called luiza Fuchs [phone] d/w Dr Gongora   she advised to restart janumet and monitor fingersticks  f/u with office after discharge and if glu is elevated  patient doing well and stable for discharge.

## 2023-12-14 NOTE — DISCHARGE NOTE PROVIDER - NSDCCPCAREPLAN_GEN_ALL_CORE_FT
PRINCIPAL DISCHARGE DIAGNOSIS  Diagnosis: DKA, type 2  Assessment and Plan of Treatment:       SECONDARY DISCHARGE DIAGNOSES  Diagnosis: Type 2 diabetes mellitus  Assessment and Plan of Treatment:

## 2023-12-14 NOTE — PROGRESS NOTE ADULT - SUBJECTIVE AND OBJECTIVE BOX
Patient with no complaints today, eager for discharge home.  Tolerating PO, voiding, ambulating independently.  No SOB, chest pain, nausea, vomiting, lightheadedness, dizziness.    Vital Signs Last 24 Hrs  T(C): 36.7 (14 Dec 2023 07:00), Max: 37.2 (13 Dec 2023 18:00)  T(F): 98 (14 Dec 2023 07:00), Max: 98.9 (13 Dec 2023 18:00)  HR: 86 (14 Dec 2023 10:00) (68 - 96)  BP: 135/70 (14 Dec 2023 10:00) (99/60 - 139/84)  BP(mean): 88 (14 Dec 2023 10:00) (64 - 100)  RR: 15 (14 Dec 2023 10:00) (11 - 32)  SpO2: 99% (14 Dec 2023 10:00) (94% - 100%)    Labs                        12.1   3.62  )-----------( 132      ( 14 Dec 2023 06:00 )             35.9   140  |  105  |  6<L>  ----------------------------<  176<H>  3.8   |  26  |  0.62    Ca    8.5      14 Dec 2023 06:00  Phos  1.6     12-14  Mg     2.0     12-14    TPro  5.8<L>  /  Alb  2.7<L>  /  TBili  0.3  /  DBili  x   /  AST  17  /  ALT  16  /  AlkPhos  42  12-13    Current Medi  chlorhexidine 2% Cloths 1 Application(s) Topical <User Schedule>  enoxaparin Injectable 40 milliGRAM(s) SubCutaneous every 24 hours  famotidine    Tablet 20 milliGRAM(s) Oral two times a day  insulin lispro (ADMELOG) corrective regimen sliding scale   SubCutaneous three times a day before meals  insulin lispro (ADMELOG) corrective regimen sliding scale   SubCutaneous at bedtime  lactated ringers. 1000 milliLiter(s) (100 mL/Hr) IV Continuous <Continuous>  melatonin 3 milliGRAM(s) Oral at bedtime  ondansetron   Disintegrating Tablet 4 milliGRAM(s) Oral three times a day  pantoprazole    Tablet 40 milliGRAM(s) Oral before breakfast  acetaminophen     Tablet .. 650 milliGRAM(s) Oral every 6 hours PRN Temp greater or equal to 38C (100.4F)  dextrose Oral Gel 15 Gram(s) Oral once PRN Blood Glucose LESS THAN 70 milliGRAM(s)/deciliter  guaiFENesin Oral Liquid (Sugar-Free) 100 milliGRAM(s) Oral every 6 hours PRN Cough  ondansetron Injectable 4 milliGRAM(s) IV Push every 6 hours PRN Nausea and/or Vomiting    Physical Exam  Gen:  WN/WD Male resting in bed, NAD  ENT:  NC/AT, no JVD noted  Thorax:  Symmetric, no retractions  Lung:  CTA b/l  CV:  S1, S2. RRR  Abd:  Soft, NT/ND.  BS normoactive, no masses to palp  Extrem:  No C/C/E, DP/radial pulses +2  Neuro:  No gross motor/sensory deficits  Psych:  Alert and oriented x3, calm and cooperative Patient with no complaints today, eager for discharge home.  Tolerating PO, voiding, ambulating independently.  No SOB, chest pain, nausea, vomiting, lightheadedness, dizziness.    Vital Signs Last 24 Hrs  T(C): 36.7 (14 Dec 2023 07:00), Max: 37.2 (13 Dec 2023 18:00)  T(F): 98 (14 Dec 2023 07:00), Max: 98.9 (13 Dec 2023 18:00)  HR: 86 (14 Dec 2023 10:00) (68 - 96)  BP: 135/70 (14 Dec 2023 10:00) (99/60 - 139/84)  BP(mean): 88 (14 Dec 2023 10:00) (64 - 100)  RR: 15 (14 Dec 2023 10:00) (11 - 32)  SpO2: 99% (14 Dec 2023 10:00) (94% - 100%)    Labs                        12.1   3.62  )-----------( 132      ( 14 Dec 2023 06:00 )             35.9   140  |  105  |  6<L>  ----------------------------<  176<H>  3.8   |  26  |  0.62    Ca    8.5      14 Dec 2023 06:00  Phos  1.6     12-14  Mg     2.0     12-14    TPro  5.8<L>  /  Alb  2.7<L>  /  TBili  0.3  /  DBili  x   /  AST  17  /  ALT  16  /  AlkPhos  42  12-13    COVID  12-12-23 @ 17:17  COVID -   NotDetec          Trend Sodium  12-14-23 @ 06:00   -  140  12-13-23 @ 20:00   -  140  12-13-23 @ 13:50   -  142  12-13-23 @ 09:30   -  142  12-13-23 @ 05:24   -  142  12-13-23 @ 01:20   -  141    Trend Potassium  12-14-23 @ 06:00   -  3.8  12-13-23 @ 20:00   -  4.0  12-13-23 @ 13:50   -  3.8  12-13-23 @ 09:30   -  4.2  12-13-23 @ 05:24   -  3.4<L>  12-13-23 @ 01:20   -  3.9    Trend Bun/Cr  12-14-23 @ 06:00  BUN/CR -  6<L> / 0.62  12-13-23 @ 20:00  BUN/CR -  11 / 0.80  12-13-23 @ 13:50  BUN/CR -  9 / 0.77  12-13-23 @ 09:30  BUN/CR -  13 / 0.85  12-13-23 @ 05:24  BUN/CR -  15 / 0.91  12-13-23 @ 01:20  BUN/CR -  16 / 1.02    Lactic Acid Trend    ABG Trend  12-13-23 @ 05:00   - 7.35/32<L>/98/98.6<H>  12-12-23 @ 15:05   - 7.21<L>/25<L>/113<H>/98.7<H>    Trend AST/ALT/ALK Phos/Bili  12-13-23 @ 20:00   17/16/42/0.3  12-12-23 @ 17:50   12/18/44/0.3  12-12-23 @ 11:37   17/19/57/0.4  10-19-23 @ 10:22   12<L>/21/55/0.5         Glucose Trend  12-14-23 @ 07:13   -  -- -- 152<H>  12-14-23 @ 06:00   -  176<H> -- --  12-13-23 @ 21:22   -  -- -- 217<H>  12-13-23 @ 20:02   -  -- -- 240<H>  12-13-23 @ 20:00   -  285<H> -- --  12-13-23 @ 18:03   -  -- -- 128<H>  12-13-23 @ 16:11   -  -- -- 165<H>  12-13-23 @ 15:10   -  -- -- 161<H>  12-13-23 @ 14:12   -  -- -- 174<H>    A1C with Estimated Average Glucose Result: 7.4 % *H* [4.0 - 5.6] (12-13-23 @ 09:30)  A1C with Estimated Average Glucose Result: 7.8 % *H* [4.0 - 5.6] (10-19-23 @ 10:22)        Current Medi  chlorhexidine 2% Cloths 1 Application(s) Topical <User Schedule>  enoxaparin Injectable 40 milliGRAM(s) SubCutaneous every 24 hours  famotidine    Tablet 20 milliGRAM(s) Oral two times a day  insulin lispro (ADMELOG) corrective regimen sliding scale   SubCutaneous three times a day before meals  insulin lispro (ADMELOG) corrective regimen sliding scale   SubCutaneous at bedtime  lactated ringers. 1000 milliLiter(s) (100 mL/Hr) IV Continuous <Continuous>  melatonin 3 milliGRAM(s) Oral at bedtime  ondansetron   Disintegrating Tablet 4 milliGRAM(s) Oral three times a day  pantoprazole    Tablet 40 milliGRAM(s) Oral before breakfast  acetaminophen     Tablet .. 650 milliGRAM(s) Oral every 6 hours PRN Temp greater or equal to 38C (100.4F)  dextrose Oral Gel 15 Gram(s) Oral once PRN Blood Glucose LESS THAN 70 milliGRAM(s)/deciliter  guaiFENesin Oral Liquid (Sugar-Free) 100 milliGRAM(s) Oral every 6 hours PRN Cough  ondansetron Injectable 4 milliGRAM(s) IV Push every 6 hours PRN Nausea and/or Vomiting    Physical Exam  Gen:  WN/WD Male resting in bed, NAD  ENT:  NC/AT, no JVD noted  Thorax:  Symmetric, no retractions  Lung:  CTA b/l  CV:  S1, S2. RRR  Abd:  Soft, NT/ND.  BS normoactive, no masses to palp  Extrem:  No C/C/E, DP/radial pulses +2  Neuro:  No gross motor/sensory deficits  Psych:  Alert and oriented x3, calm and cooperative

## 2023-12-14 NOTE — ADVANCED PRACTICE NURSE CONSULT - ASSESSMENT
HPI 57 year old male with PMH type 2 Diabetes, initially dx with euglycemic DKA (BG was 294) while being treated for acute influenza. Pt was taking his Farxiga daily while ill with Flu.    HgbA1c- 7.8 (10/19/23)  eGFR-  103 (12/13/23)    Current In-patient Diabetes Regimen-  insulin lispro (ADMELOG) corrective regimen sliding scale   SubCutaneous three times a day before meals  insulin lispro (ADMELOG) corrective regimen sliding scale   SubCutaneous at bedtime    Home Diabetes medication Regimen-  Janumet 5- 1000 mg twice a day  Farxiga 10 mg daily     Endocrinology- Dr Neel Fuchs- last seen on 12/9/23- prior to this illness    Educated pt on Type 2 Diabetes disease process, progression, management including medication especially sick day management of SGTL2 and metformin, healthy eating, as well as consequences of persistent hyperglycemia. Educated on A1c goal- 7.0- 7.5. BG goals (100- 140), when to contact healthcare provider, when to check BG and keeping BG log- provided BG Log.   Educated pt on s/s of hyperglycemia and Tx and S/S of hypoglycemia and tx per 15/15 rule. Pt validated education via teach back.    Pt does know how to check BG and use BG meter   Provided written resources of Leaving the hospital with Diabetes, A1c goal, BG Goals, BG Monitoring,  Hyper and Hypoglycemia s/s and tx, sick day management, and Preventing complications due to diabetes.  Educated pt on how to access diabetes education Videos. Pt validated education via show back.

## 2023-12-14 NOTE — DISCHARGE NOTE PROVIDER - CARE PROVIDER_API CALL
Mohan Matthews  Endocrinology/Metab/Diabetes  [facility], [address] [address]  Phone: [phone]  Fax: [phone]  Follow Up Time:    Mohan Matthews  Endocrinology/Metab/Diabetes  [facility], [address] [address]  Phone: [phone]  Fax: [phone]  Follow Up Time:

## 2023-12-14 NOTE — DISCHARGE NOTE PROVIDER - NSDCMRMEDTOKEN_GEN_ALL_CORE_FT
Janumet 50 mg-1000 mg oral tablet: 1 tab(s) orally 2 times a day  ondansetron 4 mg oral tablet, disintegratin tab(s) orally 3 times a day x 10 days as needed for  nausea  oxyCODONE 5 mg oral tablet: 1 tab(s) orally every 6 hours as needed for  severe pain MDD: 4 tabs  Pepcid 20 mg oral tablet: 1 tab(s) orally 2 times a day

## 2023-12-14 NOTE — ADVANCED PRACTICE NURSE CONSULT - RECOMMEDATIONS
Discharge Recommendations:  1.	Resume Janumet 50 mg- 1000 mg twice a day as of today  2.	Resume Farxiga 10 mg daily in 2-3 days when appetite and hydration normal and no s/s of illness.  3.	Follow up with PCP and Endocrinology.

## 2023-12-14 NOTE — DISCHARGE NOTE NURSING/CASE MANAGEMENT/SOCIAL WORK - NSDCFUADDAPPT_GEN_ALL_CORE_FT
Dr. Major's office will call you with a follow-up appointment  Dr. Woody's office will call you with follow-up appointment

## 2023-12-14 NOTE — PROGRESS NOTE ADULT - ASSESSMENT
57 year old male with PMH type 2 DM initially presenting with euglycemic DKA while being treated for acute influenza    Plan   57 year old male with PMH type 2 DM initially presenting with euglycemic DKA while being treated for acute influenza    Plan  Diet and activity as tolerated  Zofran PRN PO for nausea  Hold Farixa, continue Janumet  Follow up with primary endocrinologist early next week  Discharge home today 57 year old male with PMH type 2 DM initially presenting with euglycemic DKA while being treated for acute influenza    Plan  Doing well , restart janumet  hold farixa  Diet and activity as tolerated  Zofran PRN PO for nausea  Hold Farixa, continue Janumet  Follow up with primary endocrinologist early next week Dr evans  Discharge home today

## 2023-12-14 NOTE — DISCHARGE NOTE NURSING/CASE MANAGEMENT/SOCIAL WORK - PATIENT PORTAL LINK FT
You can access the FollowMyHealth Patient Portal offered by [facility] by registering at the following website: [email or web address]. By joining [employer]’s FollowMyHealth portal, you will also be able to view your health information using other applications (apps) compatible with our system. You can access the FollowMyHealth Patient Portal offered by [facility] by registering at the following website: [email or web address]. By joining [employer]’s FollowMyHealth portal, you will also be able to view your health information using other applications (apps) compatible with our system.

## 2023-12-14 NOTE — DISCHARGE NOTE PROVIDER - HOSPITAL COURSE
57 year old male with PMH type 2 DM presented to ED on 12/12 with complaint of nausea vomiting x 2 days. Patient had been found positive for influenza two days prior to presentation, started on Tamiflu which he then developed nausea and vomiting and therefore was unable to take his PO meds.  Laboratory evaluation revealed patient found in euglycemia DKA.     Started on insulin infusion and admitted to ICU for further management.  Blood sugars remained stable, anion gap closed and patient transitioned off of insulin infusion.     Completed course of Tamiflu as inpatient.  Tolerating PO, for discharge home on Januvia only and follow up with primary endocrinologist early next week. 57 year old male with PMH type 2 DM presented to ED on 12/12 with complaint of nausea vomiting x 2 days. Patient had been found positive for influenza two days prior to presentation, started on Tamiflu which he then developed nausea and vomiting and therefore was unable to take his PO meds.  Laboratory evaluation revealed patient found in euglycemia DKA.     Started on insulin infusion and admitted to ICU for further management.  Blood sugars remained stable, anion gap closed and patient transitioned off of insulin infusion.     Completed course of Tamiflu as inpatient.  Tolerating PO, for discharge home on Januvia only and follow up with primary endocrinologist early next week. plan to hold [facility]    For full account of hospital course please refer to actual medical records. As this is a brief summery. 57 year old male with PMH type 2 DM presented to ED on 12/12 with complaint of nausea vomiting x 2 days. Patient had been found positive for influenza two days prior to presentation, started on Tamiflu which he then developed nausea and vomiting and therefore was unable to take his PO meds.  Laboratory evaluation revealed patient found in euglycemia DKA.     Started on insulin infusion and admitted to ICU for further management.  Blood sugars remained stable, anion gap closed and patient transitioned off of insulin infusion.     Completed course of Tamiflu as inpatient.  Tolerating PO, for discharge home on Januvia only and follow up with primary endocrinologist early next week. plan to hold [facility]    For full account of hospital course please refer to actual medical records. As this is a brief summery.

## 2024-05-14 ENCOUNTER — APPOINTMENT (OUTPATIENT)
Dept: OPHTHALMOLOGY | Facility: CLINIC | Age: 59
End: 2024-05-14
Payer: COMMERCIAL

## 2024-05-14 ENCOUNTER — NON-APPOINTMENT (OUTPATIENT)
Age: 59
End: 2024-05-14

## 2024-05-14 PROCEDURE — 92136 OPHTHALMIC BIOMETRY: CPT

## 2024-05-14 PROCEDURE — 92012 INTRM OPH EXAM EST PATIENT: CPT

## 2024-07-12 NOTE — ASU PATIENT PROFILE, ADULT - NSICDXPASTSURGICALHX_GEN_ALL_CORE_FT
PAST SURGICAL HISTORY:  Elbow fracture repair     S/P Arthroscopy right knee    S/P hernia repair

## 2024-07-12 NOTE — ASU PATIENT PROFILE, ADULT - NS PREOP UNDERSTANDS INFO
Bring photo ID , insurance card, wear loose comfort cloth .No solid after midnight Sunday, no candy, chewing gum, no dairy products, clear liquids  before 05:15am . Must have an escort to take  home. Address and phone number given . No alcohol, no drugs , smoking on Sunday. No body lotion, no make up , no perfume. ./yes

## 2024-07-12 NOTE — ASU PATIENT PROFILE, ADULT - FALL HARM RISK - UNIVERSAL INTERVENTIONS
Bed in lowest position, wheels locked, appropriate side rails in place/Call bell, personal items and telephone in reach/Instruct patient to call for assistance before getting out of bed or chair/Non-slip footwear when patient is out of bed/[place] to call system/Physically safe environment - no spills, clutter or unnecessary equipment/Purposeful Proactive Rounding/Room/bathroom lighting operational, light cord in reach

## 2024-07-15 ENCOUNTER — APPOINTMENT (OUTPATIENT)
Dept: OPHTHALMOLOGY | Facility: AMBULATORY SURGERY CENTER | Age: 59
End: 2024-07-15
Payer: COMMERCIAL

## 2024-07-15 ENCOUNTER — OUTPATIENT (OUTPATIENT)
Dept: OUTPATIENT SERVICES | Facility: HOSPITAL | Age: 59
LOS: 1 days | Discharge: HOME | End: 2024-07-15

## 2024-07-15 ENCOUNTER — TRANSCRIPTION ENCOUNTER (OUTPATIENT)
Age: 59
End: 2024-07-15

## 2024-07-15 VITALS
SYSTOLIC BLOOD PRESSURE: 119 MMHG | TEMPERATURE: 97 F | RESPIRATION RATE: 16 BRPM | DIASTOLIC BLOOD PRESSURE: 63 MMHG | OXYGEN SATURATION: 97 % | HEART RATE: 84 BPM

## 2024-07-15 VITALS
SYSTOLIC BLOOD PRESSURE: 169 MMHG | TEMPERATURE: 99 F | DIASTOLIC BLOOD PRESSURE: 59 MMHG | RESPIRATION RATE: 16 BRPM | WEIGHT: 172.62 LBS | OXYGEN SATURATION: 100 % | HEART RATE: 81 BPM | HEIGHT: 72 IN

## 2024-07-15 DIAGNOSIS — Z98.890 OTHER SPECIFIED POSTPROCEDURAL STATES: Chronic | ICD-10-CM

## 2024-07-15 LAB — GLUCOSE BLDC GLUCOMTR-MCNC: 237 MG/DL — HIGH (ref 70–99)

## 2024-07-15 PROCEDURE — 6698F: CPT | Mod: LT

## 2024-07-15 PROCEDURE — 66984 XCAPSL CTRC RMVL W/O ECP: CPT | Mod: LT

## 2024-07-15 PROCEDURE — CL050: CPT | Mod: LT

## 2024-07-15 PROCEDURE — V2787: CPT | Mod: LT

## 2024-07-15 DEVICE — IMPLANTABLE DEVICE
Type: IMPLANTABLE DEVICE | Status: NON-FUNCTIONAL
Removed: 2024-07-15

## 2024-07-15 RX ORDER — OFLOXACIN 3 MG/ML
1 SOLUTION/ DROPS OPHTHALMIC
Refills: 0 | Status: COMPLETED | OUTPATIENT
Start: 2024-07-15 | End: 2024-07-15

## 2024-07-15 RX ORDER — DAPAGLIFLOZIN 10 MG/1
1 TABLET, FILM COATED ORAL
Refills: 0 | DISCHARGE

## 2024-07-15 RX ORDER — ACETAMINOPHEN 325 MG
650 TABLET ORAL ONCE
Refills: 0 | Status: DISCONTINUED | OUTPATIENT
Start: 2024-07-15 | End: 2024-07-15

## 2024-07-15 RX ORDER — TROPICAMIDE 0.5 %
1 DROPS OPHTHALMIC (EYE)
Refills: 0 | Status: COMPLETED | OUTPATIENT
Start: 2024-07-15 | End: 2024-07-15

## 2024-07-15 RX ORDER — ONDANSETRON HYDROCHLORIDE 2 MG/ML
4 INJECTION INTRAMUSCULAR; INTRAVENOUS ONCE
Refills: 0 | Status: DISCONTINUED | OUTPATIENT
Start: 2024-07-15 | End: 2024-07-15

## 2024-07-15 RX ORDER — TETRACAINE HCL 0.5 %
1 DROPS OPHTHALMIC (EYE) ONCE
Refills: 0 | Status: COMPLETED | OUTPATIENT
Start: 2024-07-15 | End: 2024-07-15

## 2024-07-15 RX ORDER — PHENYLEPHRINE HCL 2.5 %
1 DROPS OPHTHALMIC (EYE)
Refills: 0 | Status: COMPLETED | OUTPATIENT
Start: 2024-07-15 | End: 2024-07-15

## 2024-07-15 RX ORDER — DEXTROSE MONOHYDRATE AND SODIUM CHLORIDE 5; .3 G/100ML; G/100ML
1000 INJECTION, SOLUTION INTRAVENOUS
Refills: 0 | Status: DISCONTINUED | OUTPATIENT
Start: 2024-07-15 | End: 2024-07-15

## 2024-07-15 RX ADMIN — Medication 1 DROP(S): at 08:05

## 2024-07-15 RX ADMIN — Medication 1 DROP(S): at 08:00

## 2024-07-15 RX ADMIN — Medication 1 DROP(S): at 07:55

## 2024-07-15 RX ADMIN — OFLOXACIN 1 DROP(S): 3 SOLUTION/ DROPS OPHTHALMIC at 07:55

## 2024-07-15 RX ADMIN — OFLOXACIN 1 DROP(S): 3 SOLUTION/ DROPS OPHTHALMIC at 08:05

## 2024-07-15 RX ADMIN — OFLOXACIN 1 DROP(S): 3 SOLUTION/ DROPS OPHTHALMIC at 08:00

## 2024-07-15 NOTE — OPERATIVE REPORT - OPERATIVE RPOSRT DETAILS
SURGEON: Dayday Gleason MD    ASSISTANT: Masha Page MD     PRE-OP DIAGNOSIS: Cataract, Left eye    POST-OP DIAGNOSIS: Same    ANESTHESIA: MAC, topical    PROCEDURE: Femtosecond laser assisted cataract extraction, intraocular lens implant left eye    SPECIMEN/TISSUE REMOVED: None    ESTIMATED BLOOD LOSS: < 1mL    COMPLICATIONS: None    LENS: EMMANUEL +19.0D    PROCEDURE:    The patient was seen in the preoperative area. The risks, benefits, and alternatives to surgery were discussed. All questions were answered.  The patient was given standard preoperative drops. The patient was taken to the laser room and a drop of anesthetic was instilled in the eye and the eye was marked while upright. The femtosecond laser was used to perform select steps of the procedure including lens softening and arcuate incisions to reduce the patient’s corneal astigmatism. The patient was then brought to the operating room and prepped and draped in the usual sterile fashion for ophthalmic surgery.    A lid speculum was used to expose the eye.  A paracentesis was created with an MVR blade at 3 clock hours from the temporal limbus in the clockwise direction.  Next, 1% Preservative-free Lidocaine was instilled into the anterior chamber followed by viscoat.  A clear corneal incision was created with the aid of a crescent blade and a 2.75 mm sharped tip keratome.  A Utrata forceps was used to complete the continuous curvilinear capsulorrhexis.  Balanced salt solution was used for hydro dissection.  The nucleus was then phacoemulsified and aspirated using a divide and conquer technique.  The remaining epinucleus and cortical material was aspirated from the eye.  The capsular bag was then reformed using provisc in anticipation of the introduction of an intraocular lens implant.  The implant was injected into the capsular bag.  After centration, the remaining viscoelastic material was removed using the IA handpiece.    The temporal clear corneal and paracentesis incisions were hydrated with balanced salt solution to achieve adequate watertight closure. The wounds were checked for leaks and found to be negative.    A combination of antibiotic and steroid were applied to the surface of the eye, and the eye was shielded.    The patient tolerated the procedure well and was transferred to the recovery room in stable condition. They received standard postoperative instructions and an appointment to follow up the next day.

## 2024-07-15 NOTE — ASU DISCHARGE PLAN (ADULT/PEDIATRIC) - NS MD DC FALL RISK RISK
For information on Fall & Injury Prevention, visit: [email or web address]/[email or web address]-[email or web address]-mobility OR  [email or web address]://[email or web address].[email or web address]/[email or web address]-injury OR  https://www.cdc.gov/steadi/patient.html

## 2024-07-15 NOTE — ASU PREOP CHECKLIST - IDENTIFICATION BAND VERIFIED
----- Message from Jaqueline Sanchez sent at 11/17/2022 10:53 AM CST -----  Contact: Self/[phone]  Pt said that she is calling in regards to her rx for gabapentin (NEURONTIN) 300 MG capsule was sent to the wrong pharmacy pt is asking if her rx can be sent to :  [id number] [place] [id number] [facility] [address]  [facility] [address]  [facility] [address]  Phone: [phone] Fax: [phone]         done

## 2024-07-16 ENCOUNTER — APPOINTMENT (OUTPATIENT)
Dept: OPHTHALMOLOGY | Facility: CLINIC | Age: 59
End: 2024-07-16
Payer: COMMERCIAL

## 2024-07-16 ENCOUNTER — NON-APPOINTMENT (OUTPATIENT)
Age: 59
End: 2024-07-16

## 2024-07-16 PROCEDURE — 99024 POSTOP FOLLOW-UP VISIT: CPT

## 2024-07-23 ENCOUNTER — APPOINTMENT (OUTPATIENT)
Dept: OPHTHALMOLOGY | Facility: CLINIC | Age: 59
End: 2024-07-23
Payer: COMMERCIAL

## 2024-07-23 ENCOUNTER — NON-APPOINTMENT (OUTPATIENT)
Age: 59
End: 2024-07-23

## 2024-07-23 PROCEDURE — 92136 OPHTHALMIC BIOMETRY: CPT

## 2024-07-23 PROCEDURE — 99024 POSTOP FOLLOW-UP VISIT: CPT

## 2024-07-25 ENCOUNTER — APPOINTMENT (OUTPATIENT)
Dept: OPHTHALMOLOGY | Facility: AMBULATORY MEDICAL SERVICES | Age: 59
End: 2024-07-25
Payer: COMMERCIAL

## 2024-07-25 PROCEDURE — CL050: CPT

## 2024-07-25 PROCEDURE — 6698F: CPT

## 2024-07-25 PROCEDURE — 66984 XCAPSL CTRC RMVL W/O ECP: CPT | Mod: RT,79

## 2024-07-26 ENCOUNTER — NON-APPOINTMENT (OUTPATIENT)
Age: 59
End: 2024-07-26

## 2024-07-26 ENCOUNTER — APPOINTMENT (OUTPATIENT)
Dept: OPHTHALMOLOGY | Facility: CLINIC | Age: 59
End: 2024-07-26
Payer: COMMERCIAL

## 2024-07-26 PROCEDURE — 99024 POSTOP FOLLOW-UP VISIT: CPT

## 2024-07-30 ENCOUNTER — NON-APPOINTMENT (OUTPATIENT)
Age: 59
End: 2024-07-30

## 2024-07-30 ENCOUNTER — APPOINTMENT (OUTPATIENT)
Dept: OPHTHALMOLOGY | Facility: CLINIC | Age: 59
End: 2024-07-30
Payer: COMMERCIAL

## 2024-07-30 PROCEDURE — 99024 POSTOP FOLLOW-UP VISIT: CPT

## 2024-08-13 ENCOUNTER — APPOINTMENT (OUTPATIENT)
Dept: OPHTHALMOLOGY | Facility: CLINIC | Age: 59
End: 2024-08-13
Payer: COMMERCIAL

## 2024-08-13 ENCOUNTER — NON-APPOINTMENT (OUTPATIENT)
Age: 59
End: 2024-08-13

## 2024-08-13 PROCEDURE — 99024 POSTOP FOLLOW-UP VISIT: CPT

## 2024-08-27 ENCOUNTER — APPOINTMENT (OUTPATIENT)
Dept: OPHTHALMOLOGY | Facility: CLINIC | Age: 59
End: 2024-08-27

## 2024-09-12 ENCOUNTER — NON-APPOINTMENT (OUTPATIENT)
Age: 59
End: 2024-09-12

## 2024-09-12 ENCOUNTER — APPOINTMENT (OUTPATIENT)
Dept: OPHTHALMOLOGY | Facility: CLINIC | Age: 59
End: 2024-09-12
Payer: COMMERCIAL

## 2024-09-12 PROCEDURE — 99024 POSTOP FOLLOW-UP VISIT: CPT

## 2024-09-20 ENCOUNTER — NON-APPOINTMENT (OUTPATIENT)
Age: 59
End: 2024-09-20

## 2024-09-20 ENCOUNTER — APPOINTMENT (OUTPATIENT)
Dept: OPHTHALMOLOGY | Facility: CLINIC | Age: 59
End: 2024-09-20
Payer: COMMERCIAL

## 2024-09-20 PROCEDURE — 99024 POSTOP FOLLOW-UP VISIT: CPT

## 2024-09-26 ENCOUNTER — NON-APPOINTMENT (OUTPATIENT)
Age: 59
End: 2024-09-26

## 2024-09-26 ENCOUNTER — APPOINTMENT (OUTPATIENT)
Dept: OPHTHALMOLOGY | Facility: CLINIC | Age: 59
End: 2024-09-26
Payer: SELF-PAY

## 2024-09-26 PROCEDURE — CL050: CPT

## 2024-09-26 PROCEDURE — 99024 POSTOP FOLLOW-UP VISIT: CPT

## 2025-01-14 ENCOUNTER — NON-APPOINTMENT (OUTPATIENT)
Age: 60
End: 2025-01-14

## 2025-01-14 ENCOUNTER — APPOINTMENT (OUTPATIENT)
Dept: OPHTHALMOLOGY | Facility: CLINIC | Age: 60
End: 2025-01-14
Payer: COMMERCIAL

## 2025-01-14 PROCEDURE — 99213 OFFICE O/P EST LOW 20 MIN: CPT

## 2025-04-02 NOTE — H&P PST ADULT - HISTORY OF PRESENT ILLNESS
56 y/o male with hx of DM,      presents with left groin pain here for presurgical examination for planned Open Left inguinal hernia repair with Dr. Joseph on 11/2/2023. Denies history of ischemic heart disease, CHF, CVA, TIA, or Kidney Disease. Denies resting or exertional chest pain, palpitations, headache, N/V, SOB, syncope or blurry vision. Denies personal or family hx of bleeding disorder or adverse reaction with anesthesia. Denies hx of DVT or PE. Denies recent travels in the past 30 days. No fever, SOB, cough, flu-like symptoms or body rash covid screen   Pt presents to ED reporting LLQ abdominal pain starting at approximately 1400 today. Reports he took magnesium citrate to try without relief. Reports small bowel movement 1 hour prior to arrival.    56 y/o male with hx of DM, with hx of left hernia surgery 2010, h/o left elbow surgery (1992), h/o right knee surgery (1986) presents with intermittent left groin discomfort and bulge here for presurgical examination for planned Open Left inguinal hernia repair with Dr. Joseph on 11/2/2023. Denies history of ischemic heart disease, CHF, CVA, TIA, or Kidney Disease. Denies resting or exertional chest pain, palpitations, headache, N/V, SOB, syncope or blurry vision. Denies personal or family hx of bleeding disorder or adverse reaction with anesthesia. Denies hx of DVT or PE. Denies recent travels in the past 30 days. No fever, SOB, cough, flu-like symptoms or body rash covid screen.

## (undated) DEVICE — PACK MINOR WITH LAP

## (undated) DEVICE — PREP BETADINE 5% STERILE OPTHALMIC SOLUTION

## (undated) DEVICE — PACK ANTERIOR SEGMENT

## (undated) DEVICE — GLV 7.5 PROTEXIS (WHITE)

## (undated) DEVICE — FRA-ESU BOVIE FORCE TRIAD T6D04558DX: Type: DURABLE MEDICAL EQUIPMENT

## (undated) DEVICE — DRSG STERISTRIPS 0.5 X 4"

## (undated) DEVICE — SUT MAXON 2-0 27" T-5

## (undated) DEVICE — DRAPE TOWEL TIBURON ADHESIVE 19" X 30"

## (undated) DEVICE — CAPSULE GUARD I/A

## (undated) DEVICE — SUT NYLON 10-0 12" CU-5

## (undated) DEVICE — KIT CENTURION ANTERIOR

## (undated) DEVICE — VENODYNE/SCD SLEEVE CALF MEDIUM

## (undated) DEVICE — SUT POLYSORB 2-0 60" REEL

## (undated) DEVICE — SUT POLYSORB 3-0 30" V-20 UNDYED

## (undated) DEVICE — NDL HYPO SAFE 25G X 5/8" (ORANGE)

## (undated) DEVICE — DRAIN PENROSE .25" X 18" LATEX

## (undated) DEVICE — DRAPE LAPAROTOMY TRANSVERSE

## (undated) DEVICE — GOWN LG

## (undated) DEVICE — NDL HYPO SAFE 22G X 1.5" (BLACK)

## (undated) DEVICE — KNIFE ALCON CRESCENT ANGLED BEVEL UP 2.3MM (PINK)

## (undated) DEVICE — WARMING BLANKET UPPER ADULT

## (undated) DEVICE — SUT MONOCRYL 4-0 27" PS-2 UNDYED

## (undated) DEVICE — GOWN ROYAL SILK XL

## (undated) DEVICE — Device

## (undated) DEVICE — KNIFE FULL HANDLE ANGLE 2.75MM

## (undated) DEVICE — KNIFE ALCON MVR V-LANCE 20G (WHITE)

## (undated) DEVICE — DRAPE MICROSCOPE KNOB COVER SMALL (2 PCS)

## (undated) DEVICE — NDL RETROBULBAR VISITEC 25X1.5

## (undated) DEVICE — PACK CENTURION 2.75MM

## (undated) DEVICE — SOL IRR BAG BSS 500ML